# Patient Record
Sex: FEMALE | Race: WHITE | Employment: PART TIME | URBAN - METROPOLITAN AREA
[De-identification: names, ages, dates, MRNs, and addresses within clinical notes are randomized per-mention and may not be internally consistent; named-entity substitution may affect disease eponyms.]

---

## 2017-05-09 ENCOUNTER — OFFICE VISIT (OUTPATIENT)
Dept: INTERNAL MEDICINE CLINIC | Age: 57
End: 2017-05-09

## 2017-05-09 VITALS
DIASTOLIC BLOOD PRESSURE: 80 MMHG | WEIGHT: 232 LBS | SYSTOLIC BLOOD PRESSURE: 144 MMHG | HEART RATE: 62 BPM | TEMPERATURE: 97.9 F | OXYGEN SATURATION: 95 % | HEIGHT: 66 IN | BODY MASS INDEX: 37.28 KG/M2

## 2017-05-09 DIAGNOSIS — E66.9 OBESITY, UNSPECIFIED OBESITY SEVERITY, UNSPECIFIED OBESITY TYPE: ICD-10-CM

## 2017-05-09 DIAGNOSIS — R73.03 PREDIABETES: Primary | ICD-10-CM

## 2017-05-09 DIAGNOSIS — J01.90 ACUTE NON-RECURRENT SINUSITIS, UNSPECIFIED LOCATION: ICD-10-CM

## 2017-05-09 DIAGNOSIS — Z23 NEED FOR TDAP VACCINATION: ICD-10-CM

## 2017-05-09 RX ORDER — LEVOFLOXACIN 500 MG/1
500 TABLET, FILM COATED ORAL DAILY
Qty: 10 TAB | Refills: 0 | Status: SHIPPED | OUTPATIENT
Start: 2017-05-09 | End: 2017-09-19 | Stop reason: ALTCHOICE

## 2017-05-09 NOTE — LETTER
NOTIFICATION RETURN TO WORK / SCHOOL 
 
5/9/2017 5:06 PM 
 
Ms. Deedee Mg  [address] [id number] To Whom It May Concern: 
 
Anca Sandoval is currently under the care of [facility]. She will return to work/school on: 5-10-17 If there are questions or concerns please have the patient contact our office.  
 
 
 
Sincerely, 
 
 
Severa Eth, MD

## 2017-05-09 NOTE — MR AVS SNAPSHOT
Visit Information Date & Time Provider Department Dept. Phone Encounter #  
 5/9/2017  3:45 PM Elaine Barthel, [address] [phone] [id number] Follow-up Instructions Return in about 6 months (around 11/9/2017) for prediabetes BERTIN. Upcoming Health Maintenance Date Due DTaP/Tdap/Td series (1 - Tdap) 12/12/1983 BREAST CANCER SCRN MAMMOGRAM 6/1/2017 INFLUENZA AGE 9 TO ADULT 8/1/2017 PAP AKA CERVICAL CYTOLOGY 7/6/2018 COLONOSCOPY 1/16/2020 Allergies as of 5/9/2017  Review Complete On: 5/9/2017 By: Monique Tineo LPN Severity Noted Reaction Type Reactions Flagyl [Metronidazole]  06/14/2011    Hives Iodine  06/14/2011    Nausea and Vomiting Seafood [Shellfish Containing Products]  06/14/2011    Hives Current Immunizations  Reviewed on 2/27/2015 Name Date Hep A and Hep B Vaccine 4/23/2014, 11/11/2013, 10/14/2013 12:38 PM  
 Tdap  Incomplete Zoster Vaccine, Live 10/5/2013 Not reviewed this visit You Were Diagnosed With   
  
 Codes Comments Prediabetes    -  Primary ICD-10-CM: R73.03 
ICD-9-CM: 790.29 Acute non-recurrent sinusitis, unspecified location     ICD-10-CM: J01.90 ICD-9-CM: 461.9 Need for Tdap vaccination     ICD-10-CM: S64 ICD-9-CM: V06.1 Obesity, unspecified obesity severity, unspecified obesity type     ICD-10-CM: E66.9 ICD-9-CM: 278.00 Vitals BP Pulse Temp Height(growth percentile) Weight(growth percentile) SpO2  
 144/80 (BP 1 Location: Left arm, BP Patient Position: Sitting) 62 97.9 °F (36.6 °C) 5' 6\" (1.676 m) 232 lb (105.2 kg) 95% BMI Smoking Status 37.45 kg/m2 Former Smoker BMI and BSA Data Body Mass Index Body Surface Area  
 37.45 kg/m 2 2.21 m 2 Preferred Pharmacy Pharmacy Name Phone [facility] # [address], [address] [facility] [phone] Your Updated Medication List  
  
   
 This list is accurate as of: 5/9/17  5:01 PM.  Always use your most recent med list.  
  
  
  
  
 acetaminophen 500 mg tablet Commonly known as:  TYLENOL Take 1 tablet by mouth every eight (8) hours as needed for Pain. ALIGN 4 mg Cap Generic drug:  Bifidobacterium Infantis Take  by mouth.  
  
 levoFLOXacin 500 mg tablet Commonly known as:  John Colander Take 1 Tab by mouth daily. multivitamin tablet Commonly known as:  ONE A DAY Take 1 Tab by mouth daily. Prenat MV-Iron-Ca-LF-Bq7-NmwAM -1 mg Cmpk Take 1 Tab by mouth daily. Prescriptions Sent to Pharmacy Refills  
 levoFLOXacin (LEVAQUIN) 500 mg tablet 0 Sig: Take 1 Tab by mouth daily. Class: Normal  
 Pharmacy: [place] [address] [address], [address] [facility] #: [phone] Route: Oral  
  
We Performed the Following HEMOGLOBIN A1C WITH EAG [46315 CPT(R)] LIPID PANEL [45729 CPT(R)] METABOLIC PANEL, BASIC [26797 CPT(R)] TETANUS, DIPHTHERIA TOXOIDS AND ACELLULAR PERTUSSIS VACCINE (TDAP), IN INDIVIDS. >=7, IM [id number] CPT(R)] Follow-up Instructions Return in about 6 months (around 11/9/2017) for prediabetes BERTIN. Introducing [facility]! Sky Rodrigues introduces [employer] patient portal. Now you can access parts of your medical record, email your doctor's office, and request medication refills online. 1. In your internet browser, go to [email or web address] Iz okna 2. Click on the First Time User? Click Here link in the Sign In box. You will see the New Member Sign Up page. 3. Enter your [employer] Access Code exactly as it appears below. You will not need to use this code after youve completed the sign-up process. If you do not sign up before the expiration date, you must request a new code. · [employer] Access Code: [email or web address] Expires: 8/7/2017  5:01 PM 
 
4.  Enter the last four digits of your Social Security Number (xxxx) and Date of Birth (mm/dd/yyyy) as indicated and click Submit. You will be taken to the next sign-up page. 5. Create a Personal Life Media ID. This will be your Personal Life Media login ID and cannot be changed, so think of one that is secure and easy to remember. 6. Create a Personal [employer] password. You can change your password at any time. 7. Enter your Password Reset Question and Answer. This can be used at a later time if you forget your password. 8. Enter your e-mail address. You will receive e-mail notification when new information is available in [address] 9. Click Sign Up. You can now view and download portions of your medical record. 10. Click the Download Summary menu link to download a portable copy of your medical information. If you have questions, please visit the Frequently Asked Questions section of the Personal [employer] website. Remember, Personal [employer] is NOT to be used for urgent needs. For medical emergencies, dial 911. Now available from your iPhone and Android! Please provide this summary of care documentation to your next provider. Your primary care clinician is listed as Mati OTTO. If you have any questions after today's visit, please call [phone].

## 2017-05-09 NOTE — PROGRESS NOTES
HISTORY OF PRESENT ILLNESS  Mariola Sanches is a 49 y.o. female. HPI   6 month f/u   [facility] medrol davian  Still has green sinus drainage  Coughs up phlegm  Some chills. F/u prediabetes and HLD  Weight is unchamged, pt is going to start on an [employer]    Patient Active Problem List    Diagnosis Date Noted    Prediabetes 11/08/2016    Colon polyp 10/08/2013    FH: colon cancer 10/08/2013    Abnormal CT of the abdomen 07/27/2012    Mitral valve disorders 07/27/2012    Obesity 06/14/2011    BERTIN (obstructive sleep apnea) 06/14/2011    Allergic rhinitis 06/14/2011     Current Outpatient Prescriptions   Medication Sig Dispense Refill    Bifidobacterium Infantis (ALIGN) 4 mg cap Take  by mouth.  multivitamin (ONE A DAY) tablet Take 1 Tab by mouth daily.  acetaminophen (TYLENOL) 500 mg tablet Take 1 tablet by mouth every eight (8) hours as needed for Pain. 30 tablet 0    Prenat MV-Iron-Ca-KL-Ox2-DcaDI -1 mg Cmpk Take 1 Tab by mouth daily.  90 Tab 3     Allergies   Allergen Reactions    Flagyl [Metronidazole] Hives    Iodine Nausea and Vomiting    Seafood [Shellfish Containing Products] Hives      Lab Results  Component Value Date/Time   Hemoglobin A1c 6.1 11/08/2016 04:09 PM   Hemoglobin A1c 6.0 03/17/2016 04:28 PM   Hemoglobin A1c 5.9 08/26/2011 12:00 AM   Glucose 92 03/17/2016 04:28 PM   Glucose  02/27/2015 10:09 AM   LDL, calculated 114 03/17/2016 04:28 PM   Creatinine 0.78 03/17/2016 04:28 PM      Lab Results  Component Value Date/Time   Cholesterol, total 177 03/17/2016 04:28 PM   HDL Cholesterol 46 03/17/2016 04:28 PM   LDL, calculated 114 03/17/2016 04:28 PM   Triglyceride 85 03/17/2016 04:28 PM       Lab Results  Component Value Date/Time   GFR est  03/17/2016 04:28 PM   GFR est non-AA 87 03/17/2016 04:28 PM   Creatinine 0.78 03/17/2016 04:28 PM   BUN 10 03/17/2016 04:28 PM   Sodium 140 03/17/2016 04:28 PM   Potassium 4.5 03/17/2016 04:28 PM   Chloride 102 03/17/2016 04:28 PM   CO2 28 03/17/2016 04:28 PM         ROS    Physical Exam   Constitutional: She appears well-developed and well-nourished. Appears stated age   Cardiovascular: Normal rate, regular rhythm and normal heart sounds. Exam reveals no gallop and no friction rub. No murmur heard. Pulmonary/Chest: Effort normal and breath sounds normal. No respiratory distress. She has no wheezes. Abdominal: Soft. Bowel sounds are normal.   Musculoskeletal: She exhibits no edema. Neurological: She is alert. Psychiatric: She has a normal mood and affect. Nursing note and vitals reviewed. ASSESSMENT and PLAN  Elizabeth Anna was seen today for physical and sinus infection. Diagnoses and all orders for this visit:    Prediabetes  -     LIPID PANEL  -     HEMOGLOBIN A1C WITH EAG  -     METABOLIC PANEL, BASIC   To start chris diet   Advised to exercise regularly    Acute non-recurrent sinusitis, unspecified location   levaquin x 10d   Complete medrol davian  Need for Tdap vaccination  -     TETANUS, DIPHTHERIA TOXOIDS AND ACELLULAR PERTUSSIS VACCINE (TDAP), IN INDIVIDS. >=7, IM    Obesity, unspecified obesity severity, unspecified obesity type   I have reviewed/discussed the above normal BMI with the patient. I have recommended the following interventions: dietary management education, guidance, and counseling, encourage exercise and monitor weight . Dianna Wadsworth Other orders  -     levoFLOXacin (LEVAQUIN) 500 mg tablet; Take 1 Tab by mouth daily. Follow-up Disposition:  Return in about 6 months (around 11/9/2017) for prediabetes BERTIN.

## 2017-05-16 ENCOUNTER — APPOINTMENT (OUTPATIENT)
Dept: INTERNAL MEDICINE CLINIC | Age: 57
End: 2017-05-16

## 2017-05-17 LAB
BUN SERPL-MCNC: 11 MG/DL (ref 6–24)
BUN/CREAT SERPL: 13 (ref 9–23)
CALCIUM SERPL-MCNC: 9.2 MG/DL (ref 8.7–10.2)
CHLORIDE SERPL-SCNC: 99 MMOL/L (ref 96–106)
CHOLEST SERPL-MCNC: 177 MG/DL (ref 100–199)
CO2 SERPL-SCNC: 24 MMOL/L (ref 18–29)
CREAT SERPL-MCNC: 0.86 MG/DL (ref 0.57–1)
EST. AVERAGE GLUCOSE BLD GHB EST-MCNC: 123 MG/DL
GLUCOSE SERPL-MCNC: 106 MG/DL (ref 65–99)
HBA1C MFR BLD: 5.9 % (ref 4.8–5.6)
HDLC SERPL-MCNC: 44 MG/DL
LDLC SERPL CALC-MCNC: 109 MG/DL (ref 0–99)
POTASSIUM SERPL-SCNC: 4.3 MMOL/L (ref 3.5–5.2)
SODIUM SERPL-SCNC: 139 MMOL/L (ref 134–144)
TRIGL SERPL-MCNC: 121 MG/DL (ref 0–149)
VLDLC SERPL CALC-MCNC: 24 MG/DL (ref 5–40)

## 2017-09-19 ENCOUNTER — OFFICE VISIT (OUTPATIENT)
Dept: INTERNAL MEDICINE CLINIC | Age: 57
End: 2017-09-19

## 2017-09-19 VITALS
WEIGHT: 236 LBS | TEMPERATURE: 98 F | HEIGHT: 66 IN | OXYGEN SATURATION: 99 % | DIASTOLIC BLOOD PRESSURE: 72 MMHG | BODY MASS INDEX: 37.93 KG/M2 | SYSTOLIC BLOOD PRESSURE: 126 MMHG | HEART RATE: 55 BPM

## 2017-09-19 DIAGNOSIS — G62.9 NEUROPATHY: ICD-10-CM

## 2017-09-19 DIAGNOSIS — R73.03 PREDIABETES: Primary | ICD-10-CM

## 2017-09-19 NOTE — PROGRESS NOTES
HISTORY OF PRESENT ILLNESS  Brant Mancilla is a 49 y.o. female. HPI      F/u prediabetes, elevated blood pressure  Has had some tingling of bottom of right toe in the mornings  Taking a crm at compounding pharmacy for menopausal sxs  Will be seeing Dr. Inder Prado  Using cpap device for BERTIN  Weight has been stable per pt  Getting colonoscopy again in January   sees DERM Q 6 months for skin check-Dr. Kan Gunter    Patient Active Problem List    Diagnosis Date Noted    Prediabetes 11/08/2016    Colon polyp 10/08/2013    FH: colon cancer 10/08/2013    Abnormal CT of the abdomen 07/27/2012    Mitral valve disorders 07/27/2012    Obesity 06/14/2011    BERTIN (obstructive sleep apnea) 06/14/2011    Allergic rhinitis 06/14/2011     Current Outpatient Prescriptions   Medication Sig Dispense Refill    vit a,c & e-lutein-minerals (OCUVITE) tablet daily.  bioidentical hormones Take  by mouth.  Bifidobacterium Infantis (ALIGN) 4 mg cap Take  by mouth.  multivitamin (ONE A DAY) tablet Take 1 Tab by mouth daily.  acetaminophen (TYLENOL) 500 mg tablet Take 1 tablet by mouth every eight (8) hours as needed for Pain. 30 tablet 0    Prenat MV-Iron-Ca-HA-Ot0-IokHY -1 mg Cmpk Take 1 Tab by mouth daily.  90 Tab 3     Allergies   Allergen Reactions    Flagyl [Metronidazole] Hives    Iodine Nausea and Vomiting    Seafood [Shellfish Containing Products] Hives      Lab Results  Component Value Date/Time   Hemoglobin A1c 5.9 05/16/2017 09:33 AM   Hemoglobin A1c 6.1 11/08/2016 04:09 PM   Hemoglobin A1c 6.0 03/17/2016 04:28 PM   Glucose 106 05/16/2017 09:33 AM   Glucose  02/27/2015 10:09 AM   LDL, calculated 109 05/16/2017 09:33 AM   Creatinine 0.86 05/16/2017 09:33 AM      Lab Results  Component Value Date/Time   Cholesterol, total 177 05/16/2017 09:33 AM   HDL Cholesterol 44 05/16/2017 09:33 AM   LDL, calculated 109 05/16/2017 09:33 AM   Triglyceride 121 05/16/2017 09:33 AM     Lab Results  Component Value Date/Time   GFR est non-AA 77 05/16/2017 09:33 AM   GFR est AA 89 05/16/2017 09:33 AM   Creatinine 0.86 05/16/2017 09:33 AM   BUN 11 05/16/2017 09:33 AM   Sodium 139 05/16/2017 09:33 AM   Potassium 4.3 05/16/2017 09:33 AM   Chloride 99 05/16/2017 09:33 AM   CO2 24 05/16/2017 09:33 AM          ROS    Physical Exam   Constitutional: She appears well-developed and well-nourished. Appears stated age   Cardiovascular: Normal rate, regular rhythm and normal heart sounds. Exam reveals no gallop and no friction rub. No murmur heard. Pulmonary/Chest: Effort normal and breath sounds normal. No respiratory distress. She has no wheezes. Abdominal: Soft. Bowel sounds are normal.   Musculoskeletal: She exhibits no edema. Neurological: She is alert. Psychiatric: She has a normal mood and affect. Nursing note and vitals reviewed. ASSESSMENT and PLAN  Diagnoses and all orders for this visit:    1. Prediabetes  -     HEMOGLOBIN A1C WITH EAG   Low carb diet discussion and handout   Weight reduction needed-discussed    2. Neuropathy ([address]  -     HEMOGLOBIN A1C WITH EAG  -     VITAMIN G28  -     METABOLIC PANEL, BASIC   Referral to podiatrist      Follow-up Disposition:  Return in about 6 months (around 3/19/2018) for prediabetes bp weight f/u.

## 2017-09-19 NOTE — MR AVS SNAPSHOT
Visit Information Date & Time Provider Department Dept. Phone Encounter #  
 9/19/2017  2:30 PM Martha Bonilla, [address] [place] [address],[address] [phone] [id number] Follow-up Instructions Return in about 6 months (around 3/19/2018) for prediabetes bp weight f/u. Upcoming Health Maintenance Date Due  
 BREAST CANCER SCRN MAMMOGRAM 6/1/2017 PAP AKA CERVICAL CYTOLOGY 7/6/2018 COLONOSCOPY 1/16/2020 DTaP/Tdap/Td series (2 - Td) 5/9/2027 Allergies as of 9/19/2017  Review Complete On: 9/19/2017 By: Martha Bonilla MD  
  
 Severity Noted Reaction Type Reactions Flagyl [Metronidazole]  06/14/2011    Hives Iodine  06/14/2011    Nausea and Vomiting Seafood [Shellfish Containing Products]  06/14/2011    Hives Current Immunizations  Reviewed on 2/27/2015 Name Date Hep A and Hep B Vaccine 4/23/2014, 11/11/2013, 10/14/2013 12:38 PM  
 Tdap 5/9/2017 Zoster Vaccine, Live 10/5/2013 Not reviewed this visit You Were Diagnosed With   
  
 Codes Comments Prediabetes    -  Primary ICD-10-CM: R73.03 
ICD-9-CM: 790.29 Neuropathy ([address]     ICD-10-CM: G62.9 ICD-9-CM: 174. 9 Vitals BP Pulse Temp Height(growth percentile) Weight(growth percentile) 126/72 (!) 55 98 °F (36.7 °C) (Oral) 5' 6\" (1.676 m) 236 lb (107 kg) SpO2 BMI Smoking Status 99% 38.09 kg/m2 Former Smoker Vitals History BMI and BSA Data Body Mass Index Body Surface Area 38.09 kg/m 2 2.23 m 2 Preferred Pharmacy Pharmacy Name Phone [id number]  Selin [place], [address] [facility] [phone] Your Updated Medication List  
  
   
This list is accurate as of: 9/19/17  3:45 PM.  Always use your most recent med list.  
  
  
  
  
 acetaminophen 500 mg tablet Commonly known as:  TYLENOL Take 1 tablet by mouth every eight (8) hours as needed for Pain. ALIGN 4 mg Cap Generic drug:  Bifidobacterium Infantis Take  by mouth.  
  
 bioidentical hormones Take  by mouth.  
  
 multivitamin tablet Commonly known as:  ONE A DAY Take 1 Tab by mouth daily. Prenat MV-Iron-Ca-JK-Ej1-ZyfQK -1 mg Cmpk Take 1 Tab by mouth daily. vit a,c & e-lutein-minerals tablet Commonly known as:  OCUVITE  
daily. We Performed the Following HEMOGLOBIN A1C WITH EAG [21863 CPT(R)] METABOLIC PANEL, BASIC [79842 CPT(R)] REFERRAL TO PODIATRY [REF90 Custom] Comments:  
 Please evaluate patient for neuropathy of foot VITAMIN B12 [id number] CPT(R)] Follow-up Instructions Return in about 6 months (around 3/19/2018) for prediabetes bp weight f/u. Referral Information Referral ID Referred By Referred To  
  
 [id number] [address] [facility], [address] [place] [address] Phone: [phone] Fax: [phone] Visits Status Start Date End Date 1 New Request 9/19/17 9/19/18 If your referral has a status of pending review or denied, additional information will be sent to support the outcome of this decision. Introducing [facility]! Warren Fleming introduces [employer] patient portal. Now you can access parts of your medical record, email your doctor's office, and request medication refills online. 1. In your internet browser, go to [email or web address] 2. Click on the First Time User? Click Here link in the Sign In box. You will see the New Member Sign Up page. 3. Enter your [employer] Access Code exactly as it appears below. You will not need to use this code after youve completed the sign-up process. If you do not sign up before the expiration date, you must request a new code. · [employer] Access Code: [email or web address] Expires: 12/18/2017  3:41 PM 
 
4. Enter the last four digits of your Social Security Number (xxxx) and Date of Birth (mm/dd/yyyy) as indicated and click Submit.  You will be taken to the next sign-up page. 5. Create a "[employer] ID. This will be your "[employer] login ID and cannot be changed, so think of one that is secure and easy to remember. 6. Create a "[employer] password. You can change your password at any time. 7. Enter your Password Reset Question and Answer. This can be used at a later time if you forget your password. 8. Enter your e-mail address. You will receive e-mail notification when new information is available in [address] 9. Click Sign Up. You can now view and download portions of your medical record. 10. Click the Download Summary menu link to download a portable copy of your medical information. If you have questions, please visit the Frequently Asked Questions section of the "[employer] website. Remember, "[employer] is NOT to be used for urgent needs. For medical emergencies, dial 911. Now available from your iPhone and Android! Please provide this summary of care documentation to your next provider. Your primary care clinician is listed as Naga OTTO. If you have any questions after today's visit, please call [phone].

## 2017-09-20 LAB
BUN SERPL-MCNC: 17 MG/DL (ref 6–24)
BUN/CREAT SERPL: 22 (ref 9–23)
CALCIUM SERPL-MCNC: 9.2 MG/DL (ref 8.7–10.2)
CHLORIDE SERPL-SCNC: 102 MMOL/L (ref 96–106)
CO2 SERPL-SCNC: 27 MMOL/L (ref 18–29)
CREAT SERPL-MCNC: 0.78 MG/DL (ref 0.57–1)
EST. AVERAGE GLUCOSE BLD GHB EST-MCNC: 111 MG/DL
GLUCOSE SERPL-MCNC: 78 MG/DL (ref 65–99)
HBA1C MFR BLD: 5.5 % (ref 4.8–5.6)
POTASSIUM SERPL-SCNC: 4.3 MMOL/L (ref 3.5–5.2)
SODIUM SERPL-SCNC: 142 MMOL/L (ref 134–144)
VIT B12 SERPL-MCNC: 236 PG/ML (ref 211–946)

## 2018-03-20 ENCOUNTER — OFFICE VISIT (OUTPATIENT)
Dept: INTERNAL MEDICINE CLINIC | Age: 58
End: 2018-03-20

## 2018-03-20 VITALS
BODY MASS INDEX: 37.77 KG/M2 | HEIGHT: 66 IN | DIASTOLIC BLOOD PRESSURE: 69 MMHG | OXYGEN SATURATION: 98 % | WEIGHT: 235 LBS | HEART RATE: 63 BPM | TEMPERATURE: 97.9 F | SYSTOLIC BLOOD PRESSURE: 155 MMHG

## 2018-03-20 DIAGNOSIS — R73.03 PREDIABETES: ICD-10-CM

## 2018-03-20 DIAGNOSIS — R03.0 ELEVATED BLOOD PRESSURE READING: Primary | ICD-10-CM

## 2018-03-20 NOTE — PROGRESS NOTES
HISTORY OF PRESENT ILLNESS  Jose García is a 56 y.o. female. HPI        F/u prediabetes, elevated blood pressure    Last visit:  Has had some tingling of bottom of right toe in the mornings  Taking a crm at compounding pharmacy for menopausal sxs  Will be seeing Dr. Yamilet Harris  Using cpap device for BERTIN  Weight has been stable per pt  Getting colonoscopy again in January   sees DERM Q 6 months for skin check-Dr. Sherrie Martinez      [facility]  Pt made an appt at [address] [place] [address]  Urology to f/u UTI and frequent UTI        Patient Active Problem List    Diagnosis Date Noted    Prediabetes 11/08/2016    Colon polyp 10/08/2013    FH: colon cancer 10/08/2013    Abnormal CT of the abdomen 07/27/2012    Mitral valve disorders(424.0) 07/27/2012    Obesity 06/14/2011    BERTIN (obstructive sleep apnea) 06/14/2011    Allergic rhinitis 06/14/2011     Current Outpatient Prescriptions   Medication Sig Dispense Refill    vit a,c & e-lutein-minerals (OCUVITE) tablet daily.  bioidentical hormones Take  by mouth.  Bifidobacterium Infantis (ALIGN) 4 mg cap Take  by mouth.  multivitamin (ONE A DAY) tablet Take 1 Tab by mouth daily.  acetaminophen (TYLENOL) 500 mg tablet Take 1 tablet by mouth every eight (8) hours as needed for Pain. 30 tablet 0    Prenat MV-Iron-Ca-PM-Ze1-ZzgLE -1 mg Cmpk Take 1 Tab by mouth daily.  90 Tab 3     Allergies   Allergen Reactions    Flagyl [Metronidazole] Hives    Iodine Nausea and Vomiting    Seafood [Shellfish Containing Products] Hives      Lab Results  Component Value Date/Time   Hemoglobin A1c 5.5 09/19/2017 03:53 PM   Hemoglobin A1c 5.9 (H) 05/16/2017 09:33 AM   Hemoglobin A1c 6.1 (H) 11/08/2016 04:09 PM   Glucose 78 09/19/2017 03:53 PM   Glucose  02/27/2015 10:09 AM   LDL, calculated 109 (H) 05/16/2017 09:33 AM   Creatinine 0.78 09/19/2017 03:53 PM      Lab Results  Component Value Date/Time   Cholesterol, total 177 05/16/2017 09:33 AM   HDL Cholesterol 44 05/16/2017 09:33 AM   LDL, calculated 109 (H) 05/16/2017 09:33 AM   Triglyceride 121 05/16/2017 09:33 AM     Lab Results  Component Value Date/Time   GFR est non-AA 86 09/19/2017 03:53 PM   GFR est  09/19/2017 03:53 PM   Creatinine 0.78 09/19/2017 03:53 PM   BUN 17 09/19/2017 03:53 PM   Sodium 142 09/19/2017 03:53 PM   Potassium 4.3 09/19/2017 03:53 PM   Chloride 102 09/19/2017 03:53 PM   CO2 27 09/19/2017 03:53 PM        ROS    Physical Exam   Constitutional: She appears well-developed and well-nourished. No distress. Appears stated age   Cardiovascular: Normal rate, regular rhythm and normal heart sounds. Exam reveals no gallop and no friction rub. No murmur heard. Pulmonary/Chest: Effort normal and breath sounds normal. No respiratory distress. She has no wheezes. Abdominal: Soft. Bowel sounds are normal.   Musculoskeletal: She exhibits no edema. Neurological: She is alert. Psychiatric: She has a normal mood and affect. Nursing note and vitals reviewed. ASSESSMENT and PLAN  Diagnoses and all orders for this visit:    1. Elevated blood pressure reading   Pt declines bp lowering medication   DASH diet handout   bp monitoring  2. Prediabetes   Last a1c , glucose wn   Advised weight reduction , diet and exercise    Follow-up Disposition:  Return in about 6 months (around 9/20/2018) for cpe-bp /prediabetes.

## 2018-03-20 NOTE — MR AVS SNAPSHOT
[address] [facility] 
[phone] Patient: Mitchell Cano MRN: [id number] :1960 Visit Information Date & Time Provider Department Dept. Phone Encounter #  
 3/20/2018  3:00 PM Milad Vegas, [address] [place] [address],[address] [phone] [id number] Follow-up Instructions Return in about 6 months (around 2018) for cpe-bp /prediabetes. Upcoming Health Maintenance Date Due  
 BREAST CANCER SCRN MAMMOGRAM 2017 PAP AKA CERVICAL CYTOLOGY 2018 COLONOSCOPY 2020 DTaP/Tdap/Td series (2 - Td) 2027 Allergies as of 3/20/2018  Review Complete On: 2017 By: Milad Vegas MD  
  
 Severity Noted Reaction Type Reactions Flagyl [Metronidazole]  2011    Hives Iodine  2011    Nausea and Vomiting Seafood [Shellfish Containing Products]  2011    Hives Current Immunizations  Reviewed on 2015 Name Date Hep A and Hep B Vaccine 2014, 2013, 10/14/2013 12:38 PM  
 Tdap 2017 Zoster Vaccine, Live 10/5/2013 Not reviewed this visit You Were Diagnosed With   
  
 Codes Comments Elevated blood pressure reading    -  Primary ICD-10-CM: R03.0 ICD-9-CM: 796.2 Prediabetes     ICD-10-CM: R73.03 
ICD-9-CM: 790.29 Vitals BP Pulse Temp Height(growth percentile) Weight(growth percentile) SpO2  
 155/69 (BP 1 Location: Left arm, BP Patient Position: Sitting) 63 97.9 °F (36.6 °C) (Oral) 5' 6\" (1.676 m) 235 lb (106.6 kg) 98% BMI OB Status Smoking Status 37.93 kg/m2 Menopause Former Smoker BMI and BSA Data Body Mass Index Body Surface Area  
 37.93 kg/m 2 2.23 m 2 Preferred Pharmacy Pharmacy Name Phone [facility][id number] - [address] [phone] Your Updated Medication List  
  
   
 This list is accurate as of 3/20/18  4:01 PM.  Always use your most recent med list.  
  
  
  
  
 acetaminophen 500 mg tablet Commonly known as:  TYLENOL Take 1 tablet by mouth every eight (8) hours as needed for Pain. ALIGN 4 mg Cap Generic drug:  Bifidobacterium Infantis Take  by mouth.  
  
 bioidentical hormones Take  by mouth.  
  
 multivitamin tablet Commonly known as:  ONE A DAY Take 1 Tab by mouth daily. Prenat MV-Iron-Ca-OK-Qa6-PvdBX -1 mg Cmpk Take 1 Tab by mouth daily. vit a,c & e-lutein-minerals tablet Commonly known as:  OCUVITE  
daily. Follow-up Instructions Return in about 6 months (around 9/20/2018) for cpe-bp /prediabetes. Introducing \[facility]\"" & HEALTH SERVICES! [facility] introduces [employer] patient portal. Now you can access parts of your medical record, email your doctor's office, and request medication refills online. 1. In your internet browser, go to [email or web address] 2. Click on the First Time User? Click Here link in the Sign In box. You will see the New Member Sign Up page. 3. Enter your [employer] Access Code exactly as it appears below. You will not need to use this code after youve completed the sign-up process. If you do not sign up before the expiration date, you must request a new code. · [employer] Access Code: [email or web address] Expires: 6/18/2018  4:01 PM 
 
4. Enter the last four digits of your Social Security Number (xxxx) and Date of Birth (mm/dd/yyyy) as indicated and click Submit. You will be taken to the next sign-up page. 5. Create a [employer] ID. This will be your [employer] login ID and cannot be changed, so think of one that is secure and easy to remember. 6. Create a [employer] password. You can change your password at any time. 7. Enter your Password Reset Question and Answer. This can be used at a later time if you forget your password. 8. Enter your e-mail address. You will receive e-mail notification when new information is available in [address] 9. Click Sign Up. You can now view and download portions of your medical record. 10. Click the Download Summary menu link to download a portable copy of your medical information. If you have questions, please visit the Frequently Asked Questions section of the Epuls website. Remember, Ep[employer] is NOT to be used for urgent needs. For medical emergencies, dial 911. Now available from your iPhone and Android! Please provide this summary of care documentation to your next provider. Your primary care clinician is listed as Governor Amada OTTO. If you have any questions after today's visit, please call [phone].

## 2018-09-18 ENCOUNTER — OFFICE VISIT (OUTPATIENT)
Dept: INTERNAL MEDICINE CLINIC | Age: 58
End: 2018-09-18

## 2018-09-18 VITALS
HEIGHT: 66 IN | BODY MASS INDEX: 38.57 KG/M2 | DIASTOLIC BLOOD PRESSURE: 82 MMHG | SYSTOLIC BLOOD PRESSURE: 132 MMHG | WEIGHT: 240 LBS | TEMPERATURE: 97 F | HEART RATE: 52 BPM | OXYGEN SATURATION: 99 %

## 2018-09-18 DIAGNOSIS — Z00.00 ROUTINE GENERAL MEDICAL EXAMINATION AT A HEALTH CARE FACILITY: Primary | ICD-10-CM

## 2018-09-18 DIAGNOSIS — E66.9 OBESITY (BMI 30-39.9): ICD-10-CM

## 2018-09-18 DIAGNOSIS — R03.0 ELEVATED SYSTOLIC BLOOD PRESSURE READING WITHOUT DIAGNOSIS OF HYPERTENSION: ICD-10-CM

## 2018-09-18 DIAGNOSIS — R73.03 PREDIABETES: ICD-10-CM

## 2018-09-18 DIAGNOSIS — R31.29 MICROSCOPIC HEMATURIA: ICD-10-CM

## 2018-09-18 PROBLEM — E66.01 SEVERE OBESITY (BMI 35.0-39.9): Status: ACTIVE | Noted: 2018-09-18

## 2018-09-18 RX ORDER — ESTRADIOL 10 UG/1
10 INSERT VAGINAL
Qty: 10 TAB | Refills: 11 | Status: SHIPPED | OUTPATIENT
Start: 2018-09-20 | End: 2019-03-28

## 2018-09-18 NOTE — PROGRESS NOTES
Chief Complaint Patient presents with  Complete Physical  
  6 month follow up  Blood sugar problem 6 month follow up  Labs 6 month follow up

## 2018-09-18 NOTE — PROGRESS NOTES
HISTORY OF PRESENT ILLNESS Bill Red is a 56 y.o. female. HPI  
  
F/u prediabetes, elevated blood pressure, BERTIN, obesity S/p removal ganglion cyst left index finger 2 months ago--Dr Sherrie Givens 
 
  
Last visit: 
Has had some tingling of bottom of right toe in the mornings Taking a crm at compounding pharmacy for menopausal sxs Will be seeing Dr. Sukhdev Schroeder Using cpap device for BERTIN Weight has been stable per pt Getting colonoscopy again in January 
 sees DERM Q 6 months for skin check-Dr. Grisel Jacobson 
  
  
[facility] Pt made an appt at [facility] to f/u UTI and frequent UTI 
  
 
Patient Active Problem List  
 Diagnosis Date Noted  Severe obesity (BMI 35.0-39.9) ([facility] [address] 09/18/2018  Prediabetes 11/08/2016  Colon polyp 10/08/2013  
 FH: colon cancer 10/08/2013  Abnormal CT of the abdomen 07/27/2012  Mitral valve disorders(424.0) 07/27/2012  Obesity 06/14/2011  BERTIN (obstructive sleep apnea) 06/14/2011  Allergic rhinitis 06/14/2011 Current Outpatient Prescriptions Medication Sig Dispense Refill  bioidentical hormones Take  by mouth.  Prenat MV-Iron-Ca-KA-Rh8-OanBT -1 mg Cmpk Take 1 Tab by mouth daily. 90 Tab 3  
 vit a,c & e-lutein-minerals (OCUVITE) tablet daily.  Bifidobacterium Infantis (ALIGN) 4 mg cap Take  by mouth.  multivitamin (ONE A DAY) tablet Take 1 Tab by mouth daily.  acetaminophen (TYLENOL) 500 mg tablet Take 1 tablet by mouth every eight (8) hours as needed for Pain. 30 tablet 0 Allergies Allergen Reactions  Flagyl [Metronidazole] Hives  Iodine Nausea and Vomiting  Seafood [Shellfish Containing Products] Hives Lab Results Component Value Date/Time Hemoglobin A1c 5.5 09/19/2017 03:53 PM  
Hemoglobin A1c 5.9 (H) 05/16/2017 09:33 AM  
Hemoglobin A1c 6.1 (H) 11/08/2016 04:09 PM  
Glucose 78 09/19/2017 03:53 PM  
Glucose  02/27/2015 10:09 AM  
 LDL, calculated 109 (H) 05/16/2017 09:33 AM  
Creatinine 0.78 09/19/2017 03:53 PM  
  
Lab Results Component Value Date/Time Cholesterol, total 177 05/16/2017 09:33 AM  
HDL Cholesterol 44 05/16/2017 09:33 AM  
LDL, calculated 109 (H) 05/16/2017 09:33 AM  
Triglyceride 121 05/16/2017 09:33 AM  
 
Lab Results Component Value Date/Time GFR est non-AA 86 09/19/2017 03:53 PM  
GFR est  09/19/2017 03:53 PM  
Creatinine 0.78 09/19/2017 03:53 PM  
BUN 17 09/19/2017 03:53 PM  
Sodium 142 09/19/2017 03:53 PM  
Potassium 4.3 09/19/2017 03:53 PM  
Chloride 102 09/19/2017 03:53 PM  
CO2 27 09/19/2017 03:53 PM  
  
 
ROS Physical Exam  
Constitutional: She appears well-developed and well-nourished. Appears stated age, obesity HENT:  
Mouth/Throat: Oropharynx is clear and moist.  
Eyes: Pupils are equal, round, and reactive to light. Neck: No JVD present. No tracheal deviation present. No thyromegaly present. Cardiovascular: Normal rate, regular rhythm, normal heart sounds and intact distal pulses. Exam reveals no gallop and no friction rub. No murmur heard. Pulmonary/Chest: Effort normal and breath sounds normal. No respiratory distress. She has no wheezes. Abdominal: Soft. Bowel sounds are normal. She exhibits no distension and no mass. There is no tenderness. There is no rebound and no guarding. Musculoskeletal: She exhibits no edema. Lymphadenopathy:  
  She has no cervical adenopathy. Neurological: She is alert. Skin: Skin is warm. Psychiatric: She has a normal mood and affect. Nursing note and vitals reviewed. ASSESSMENT and PLAN Diagnoses and all orders for this visit: 1. Routine general medical examination at a health care facility 
-     CBC W/O DIFF 
-     METABOLIC PANEL, COMPREHENSIVE 
-     LIPID PANEL 
-     HEMOGLOBIN A1C WITH EAG 
-     TSH 3RD GENERATION 
 UTD well woman care and colonoscopy Declines flu shot 2. Obesity (BMI 30-39. 9) I have reviewed/discussed the above normal BMI with the patient. I have recommended the following interventions: dietary management education, guidance, and counseling and encourage exercise . Jaspal Gatica 3. Prediabetes 4. Elevated systolic blood pressure reading without diagnosis of hypertension Normotensive to my check 5. Microscopic hematuria Recent neg w/u I refilled vaginal estrogen tab Other orders -     estradiol (YUVAFEM) 10 mcg tab vaginal tablet; Insert 1 Tab into vagina every Monday and Thursday. Follow-up Disposition: 
Return in about 6 months (around 3/18/2019) for prediabetes bp elevated.

## 2018-09-18 NOTE — MR AVS SNAPSHOT
[address] [facility] 
[phone] Patient: Maurilio Quach MRN: [id number] :1960 Visit Information Date & Time Provider Department Dept. Phone Encounter #  
 2018  3:00 PM Ziggy Aldridge [facility] [phone] [id number] Follow-up Instructions Return in about 6 months (around 3/18/2019) for prediabetes bp elevated. Upcoming Health Maintenance Date Due  
 BREAST CANCER SCRN MAMMOGRAM 2017 PAP AKA CERVICAL CYTOLOGY 2018 Influenza Age 5 to Adult 3/31/2019* COLONOSCOPY 2020 DTaP/Tdap/Td series (2 - Td) 2027 *Topic was postponed. The date shown is not the original due date. Allergies as of 2018  Review Complete On: 2018 By: Nerissa Wang LPN Severity Noted Reaction Type Reactions Flagyl [Metronidazole]  2011    Hives Iodine  2011    Nausea and Vomiting Seafood [Shellfish Containing Products]  2011    Hives Current Immunizations  Reviewed on 2015 Name Date Hep A and Hep B Vaccine 2014, 2013, 10/14/2013 12:38 PM  
 Tdap 2017 Zoster Vaccine, Live 10/5/2013 Not reviewed this visit You Were Diagnosed With   
  
 Codes Comments Routine general medical examination at a health care facility    -  Primary ICD-10-CM: Z00.00 ICD-9-CM: V70.0 Obesity (BMI 30-39. 9)     ICD-10-CM: E66.9 ICD-9-CM: 278.00 Prediabetes     ICD-10-CM: R73.03 
ICD-9-CM: 790.29 Elevated systolic blood pressure reading without diagnosis of hypertension     ICD-10-CM: R03.0 ICD-9-CM: 796.2 Microscopic hematuria     ICD-10-CM: R31.29 ICD-9-CM: 599.72 Vitals BP Pulse Temp Height(growth percentile) Weight(growth percentile) SpO2  
 132/82 (!) 52 97 °F (36.1 °C) (Oral) 5' 6\" (1.676 m) 240 lb (108.9 kg) 99% BMI OB Status Smoking Status 38.74 kg/m2 Menopause Former Smoker Vitals History BMI and BSA Data Body Mass Index Body Surface Area 38.74 kg/m 2 2.25 m 2 Preferred Pharmacy Pharmacy Name Phone [facility][id number] - [address] [address] [phone] Your Updated Medication List  
  
   
This list is accurate as of 9/18/18  3:50 PM.  Always use your most recent med list.  
  
  
  
  
 acetaminophen 500 mg tablet Commonly known as:  TYLENOL Take 1 tablet by mouth every eight (8) hours as needed for Pain. ALIGN 4 mg Cap Generic drug:  Bifidobacterium Infantis Take  by mouth.  
  
 bioidentical hormones Take  by mouth.  
  
 estradiol 10 mcg Tab vaginal tablet Commonly known as:  Florecitaeriberto Youngblood Insert 1 Tab into vagina every Monday and Thursday. Start taking on:  9/20/2018  
  
 multivitamin tablet Commonly known as:  ONE A DAY Take 1 Tab by mouth daily. Prenat MV-Iron-Ca-ES-Ht3-SfcKL -1 mg Cmpk Take 1 Tab by mouth daily. vit a,c & e-lutein-minerals tablet Commonly known as:  OCUVITE  
daily. Prescriptions Sent to Pharmacy Refills  
 estradiol (YUVAFEM) 10 mcg tab vaginal tablet 11 Starting on: 9/20/2018 Sig: Insert 1 Tab into vagina every Monday and Thursday. Class: Normal  
 Pharmacy: [facility] [address] [place] [address] Ph #: [phone] Route: Vaginal  
  
We Performed the Following CBC W/O DIFF [72273 CPT(R)] HEMOGLOBIN A1C WITH EAG [65875 CPT(R)] LIPID PANEL [74434 CPT(R)] METABOLIC PANEL, COMPREHENSIVE [46448 CPT(R)] TSH 3RD GENERATION [52003 CPT(R)] Follow-up Instructions Return in about 6 months (around 3/18/2019) for prediabetes bp elevated. Introducing [facility] & HEALTH SERVICES! Miss[facility] introduces [employer] patient portal. Now you can access parts of your medical record, email your doctor's office, and request medication refills online. 1. In your internet browser, go to [email or web address] 2. Click on the First Time User? Click Here link in the Sign In box. You will see the New Member Sign Up page. 3. Enter your [employer] Access Code exactly as it appears below. You will not need to use this code after youve completed the sign-up process. If you do not sign up before the expiration date, you must request a new code. · [employer] Access Code: [email or web address] Expires: 12/11/2018  1:49 PM 
 
4. Enter the last four digits of your Social Security Number (xxxx) and Date of Birth (mm/dd/yyyy) as indicated and click Submit. You will be taken to the next sign-up page. 5. Create a DeNovaMedt ID. This will be your [employer] login ID and cannot be changed, so think of one that is secure and easy to remember. 6. Create a [employer] password. You can change your password at any time. 7. Enter your Password Reset Question and Answer. This can be used at a later time if you forget your password. 8. Enter your e-mail address. You will receive e-mail notification when new information is available in [address] 9. Click Sign Up. You can now view and download portions of your medical record. 10. Click the Download Summary menu link to download a portable copy of your medical information. If you have questions, please visit the Frequently Asked Questions section of the [employer] website. Remember, [employer] is NOT to be used for urgent needs. For medical emergencies, dial 911. Now available from your iPhone and Android! Please provide this summary of care documentation to your next provider. Your primary care clinician is listed as Chase OTTO. If you have any questions after today's visit, please call [phone].

## 2018-11-03 ENCOUNTER — OFFICE VISIT (OUTPATIENT)
Dept: URGENT CARE | Age: 58
End: 2018-11-03

## 2018-11-03 VITALS
SYSTOLIC BLOOD PRESSURE: 169 MMHG | HEART RATE: 68 BPM | WEIGHT: 241 LBS | TEMPERATURE: 98 F | OXYGEN SATURATION: 99 % | DIASTOLIC BLOOD PRESSURE: 77 MMHG | HEIGHT: 66 IN | RESPIRATION RATE: 16 BRPM | BODY MASS INDEX: 38.73 KG/M2

## 2018-11-03 DIAGNOSIS — R35.0 URINARY FREQUENCY: Primary | ICD-10-CM

## 2018-11-03 DIAGNOSIS — N39.0 URINARY TRACT INFECTION WITH HEMATURIA, SITE UNSPECIFIED: ICD-10-CM

## 2018-11-03 DIAGNOSIS — R31.9 URINARY TRACT INFECTION WITH HEMATURIA, SITE UNSPECIFIED: ICD-10-CM

## 2018-11-03 LAB
BILIRUB UR QL STRIP: NEGATIVE
GLUCOSE UR-MCNC: NEGATIVE MG/DL
KETONES P FAST UR STRIP-MCNC: NORMAL MG/DL
PH UR STRIP: 5.5 [PH] (ref 4.6–8)
PROT UR QL STRIP: NORMAL
SP GR UR STRIP: 1.03 (ref 1–1.03)
UA UROBILINOGEN AMB POC: NORMAL (ref 0.2–1)
URINALYSIS CLARITY POC: NORMAL
URINALYSIS COLOR POC: NORMAL
URINE BLOOD POC: NORMAL
URINE LEUKOCYTES POC: NORMAL
URINE NITRITES POC: NEGATIVE

## 2018-11-03 RX ORDER — NITROFURANTOIN 25; 75 MG/1; MG/1
100 CAPSULE ORAL 2 TIMES DAILY
Qty: 10 CAP | Refills: 0 | Status: SHIPPED | OUTPATIENT
Start: 2018-11-03 | End: 2018-11-03

## 2018-11-03 RX ORDER — PHENAZOPYRIDINE HYDROCHLORIDE 95 MG/1
190 TABLET ORAL
Qty: 12 TAB | Refills: 0 | Status: SHIPPED | OUTPATIENT
Start: 2018-11-03 | End: 2018-11-05

## 2018-11-03 RX ORDER — NITROFURANTOIN 25; 75 MG/1; MG/1
100 CAPSULE ORAL 2 TIMES DAILY
Qty: 10 CAP | Refills: 0 | Status: SHIPPED | OUTPATIENT
Start: 2018-11-03 | End: 2018-11-08

## 2018-11-03 NOTE — PROGRESS NOTES
Erick Morley is a 56 y.o. Female presenting to clinic today with urinary discomfort x \"a few days. \" She states that it worsened yesterday so she came for evaluation. She reports that it is typical of bladder infections that she has had in the past. Endorses pressure after voiding, urinary frequency, and urgency. States that she has had so much discomfort that she used a vaginal suppository pill (unknown name) to alleviate her symptoms. She stated that it relieved her symptoms temporarily. Denies fevers, chills, or flank pain. Reports that she is followed by [facility] for hematuria. Denies other complaint today. The history is provided by the patient. History limited by: nothing. Past Medical History:   Diagnosis Date    Bronchitis         Past Surgical History:   Procedure Laterality Date    HX BUNIONECTOMY Bilateral     x2 both feet    HX ORTHOPAEDIC      right hand ligament repair         Family History   Problem Relation Age of Onset    Elevated Lipids Mother     Cancer Father         pancreatic s/p whipple    Hypertension Father         Social History     Socioeconomic History    Marital status: SINGLE     Spouse name: Not on file    Number of children: Not on file    Years of education: Not on file    Highest education level: Not on file   Social Needs    Financial resource strain: Not on file    Food insecurity - worry: Not on file    Food insecurity - inability: Not on file   [employer] needs - medical: Not on file   [employer] needs - non-medical: Not on file   Occupational History    Not on file   Tobacco Use    Smoking status: Former Smoker     Packs/day: 0.25     Years: 10.00     Pack years: 2.50    Smokeless tobacco: Never Used   Substance and Sexual Activity    Alcohol use:  Yes     Alcohol/week: 0.5 oz     Types: 1 Glasses of wine per week    Drug use: Yes     Types: OTC, Prescription    Sexual activity: Yes     Partners: Male     Comment: rarely   Other Topics Concern    Not on file   Social History Narrative    Not on file                ALLERGIES: Flagyl [metronidazole]; Iodine; and Seafood [shellfish containing products]    Review of Systems   Constitutional: Negative for chills and fever. HENT: Negative for congestion, rhinorrhea, sneezing and sore throat. Eyes: Negative for pain. Respiratory: Negative for chest tightness, shortness of breath and wheezing. Cardiovascular: Negative for chest pain. Gastrointestinal: Negative for abdominal pain, nausea and vomiting. Genitourinary: Positive for dysuria, frequency and urgency. Negative for flank pain. Musculoskeletal: Negative for back pain. Neurological: Negative for dizziness. Vitals:    11/03/18 1740   BP: 169/77   Pulse: 68   Resp: 16   Temp: 98 °F (36.7 °C)   SpO2: 99%   Weight: 241 lb (109.3 kg)   Height: 5' 6\" (1.676 m)       Physical Exam   Constitutional: She is oriented to person, place, and time. She appears well-developed and well-nourished. No distress. HENT:   Head: Normocephalic and atraumatic. Eyes: EOM are normal.   Neck: Normal range of motion. Cardiovascular: Normal rate, regular rhythm and normal heart sounds. Pulmonary/Chest: Effort normal and breath sounds normal. No respiratory distress. Abdominal: Soft. Bowel sounds are normal. She exhibits no distension. There is no tenderness. Musculoskeletal: Normal range of motion. No CVA tenderness. Neurological: She is alert and oriented to person, place, and time. Skin: Skin is warm and dry. She is not diaphoretic. Psychiatric: She has a normal mood and affect. Her behavior is normal. Judgment and thought content normal.   Nursing note and vitals reviewed.       MDM  Results for orders placed or performed in visit on 11/03/18   AMB POC URINALYSIS DIP STICK AUTO W/O MICRO   Result Value Ref Range    Color (UA POC)      Clarity (UA POC)      Glucose (UA POC) Negative Negative    Bilirubin (UA POC) Negative Negative    Ketones (UA POC) 1+ Negative    Specific gravity (UA POC) 1.030 1.001 - 1.035    Blood (UA POC) 3+ Negative    pH (UA POC) 5.5 4.6 - 8.0    Protein (UA POC) Trace Negative    Urobilinogen (UA POC) 0.2 mg/dL 0.2 - 1    Nitrites (UA POC) Negative Negative    Leukocyte esterase (UA POC) Trace Negative       PLAN:  Patient presents to clinic with dysuria, frequency, and urgency x \"a few days. \" Reports frequent UTIs and states that this feels the same. Followed by urology for hematuria. Afebrile, nontoxic appearing, no CVA tenderness. 1. Urine shows trace leukocytes, 3+ blood, and 1+ ketones. Not a definitive infection, but will treat based on symptoms. Culture sent. 2. Rx macrobid (had to re- prescribe this so it could be sent to pharmacy) and PRN pyridium  x2 days. 3. Advised patient to increase fluid intake, decrease caffeine intake, make sure to void regularly and not hold urine in the bladder for long periods of time. 4. Follow up with urology  5. Follow up with PCP this week  6. Go to ED with worsening symptoms, failure to improve, or any new symptoms. DIAGNOSES:    ICD-10-CM ICD-9-CM    1. Urinary frequency R35.0 788.41 AMB POC URINALYSIS DIP STICK AUTO W/O MICRO      CULTURE, URINE   2. Urinary tract infection with hematuria, site unspecified N39.0 599.0     R31.9 599.70      Medications Ordered Today   Medications    DISCONTD: nitrofurantoin, macrocrystal-monohydrate, (MACROBID) 100 mg capsule     Sig: Take 1 Cap by mouth two (2) times a day for 5 days. Dispense:  10 Cap     Refill:  0    nitrofurantoin, macrocrystal-monohydrate, (MACROBID) 100 mg capsule     Sig: Take 1 Cap by mouth two (2) times a day for 5 days. Dispense:  10 Cap     Refill:  0    phenazopyridine (PYRIDIUM) 95 mg tab     Sig: Take 2 Tabs by mouth three (3) times daily as needed for Pain for up to 2 days.      Dispense:  12 Tab     Refill:  0     Procedures

## 2018-11-03 NOTE — PATIENT INSTRUCTIONS
Follow up with your urologist within 1-2 weeks. Follow up with your primary care provider this week. Go to the Emergency Department with worsening symptoms, failure to improve, or any new symptoms. Urinary Tract Infection in Women: Care Instructions  Your Care Instructions    A urinary tract infection, or UTI, is a general term for an infection anywhere between the kidneys and the urethra (where urine comes out). Most UTIs are bladder infections. They often cause pain or burning when you urinate. UTIs are caused by bacteria and can be cured with antibiotics. Be sure to complete your treatment so that the infection goes away. Follow-up care is a key part of your treatment and safety. Be sure to make and go to all appointments, and call your doctor if you are having problems. It's also a good idea to know your test results and keep a list of the medicines you take. How can you care for yourself at home? · Take your antibiotics as directed. Do not stop taking them just because you feel better. You need to take the full course of antibiotics. · Drink extra water and other fluids for the next day or two. This may help wash out the bacteria that are causing the infection. (If you have kidney, heart, or liver disease and have to limit fluids, talk with your doctor before you increase your fluid intake.)  · Avoid drinks that are carbonated or have caffeine. They can irritate the bladder. · Urinate often. Try to empty your bladder each time. · To relieve pain, take a hot bath or lay a heating pad set on low over your lower belly or genital area. Never go to sleep with a heating pad in place. To prevent UTIs  · Drink plenty of water each day. This helps you urinate often, which clears bacteria from your system. (If you have kidney, heart, or liver disease and have to limit fluids, talk with your doctor before you increase your fluid intake.)  · Urinate when you need to.   · Urinate right after you have sex.  · Change sanitary pads often. · Avoid douches, bubble baths, feminine hygiene sprays, and other feminine hygiene products that have deodorants. · After going to the bathroom, wipe from front to back. When should you call for help? Call your doctor now or seek immediate medical care if:    · Symptoms such as fever, chills, nausea, or vomiting get worse or appear for the first time.     · You have new pain in your back just below your rib cage. This is called flank pain.     · There is new blood or pus in your urine.     · You have any problems with your antibiotic medicine.    Watch closely for changes in your health, and be sure to contact your doctor if:    · You are not getting better after taking an antibiotic for 2 days.     · Your symptoms go away but then come back. Where can you learn more? Go to http://kieran-joe.info/. Enter Y012 in the search box to learn more about \"Urinary Tract Infection in Women: Care Instructions. \"  Current as of: March 21, 2018  Content Version: 11.8  © 4744-2722 Healthwise, Incorporated. Care instructions adapted under license by [employer] (which disclaims liability or warranty for this information). If you have questions about a medical condition or this instruction, always ask your healthcare professional. [address] any warranty or liability for your use of this information.

## 2018-11-06 LAB
BACTERIA UR CULT: ABNORMAL
BACTERIA UR CULT: ABNORMAL

## 2018-12-24 ENCOUNTER — OFFICE VISIT (OUTPATIENT)
Dept: URGENT CARE | Age: 58
End: 2018-12-24

## 2018-12-24 VITALS
WEIGHT: 237 LBS | SYSTOLIC BLOOD PRESSURE: 137 MMHG | BODY MASS INDEX: 38.09 KG/M2 | DIASTOLIC BLOOD PRESSURE: 66 MMHG | OXYGEN SATURATION: 97 % | RESPIRATION RATE: 16 BRPM | TEMPERATURE: 98.8 F | HEART RATE: 66 BPM | HEIGHT: 66 IN

## 2018-12-24 DIAGNOSIS — M65.4 TENDINITIS, DE QUERVAIN'S: Primary | ICD-10-CM

## 2018-12-24 DIAGNOSIS — M79.642 PAIN OF LEFT HAND: ICD-10-CM

## 2018-12-24 NOTE — LETTER
[address]Wu [place] [address] 
[phone] Work/School Note Date: 12/24/2018 To Whom It May concern: 
 
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 Sincerely, Myron Pederson MD

## 2018-12-24 NOTE — PROGRESS NOTES
Hand Pain   This is a chronic problem. Episode onset: Reports persistent left thumb on the lateral aspect left thumb for \"a long time\"; works on the computer and register doing repetitive movements, denies numbness, tingling, weakness, shooting pain. The problem occurs constantly. The problem has not changed since onset. Pertinent negatives include no chest pain and no shortness of breath. She has tried nothing for the symptoms. Past Medical History:   Diagnosis Date    Bronchitis         Past Surgical History:   Procedure Laterality Date    HX BUNIONECTOMY Bilateral     x2 both feet    HX ORTHOPAEDIC      right hand ligament repair         Family History   Problem Relation Age of Onset    Elevated Lipids Mother     Cancer Father         pancreatic s/p whipple    Hypertension Father         Social History     Socioeconomic History    Marital status: SINGLE     Spouse name: Not on file    Number of children: Not on file    Years of education: Not on file    Highest education level: Not on file   Social Needs    Financial resource strain: Not on file    Food insecurity - worry: Not on file    Food insecurity - inability: Not on file   [email or web address] needs - medical: Not on file   [email or web address] needs - non-medical: Not on file   Occupational History    Not on file   Tobacco Use    Smoking status: Former Smoker     Packs/day: 0.25     Years: 10.00     Pack years: 2.50    Smokeless tobacco: Never Used   Substance and Sexual Activity    Alcohol use: Yes     Alcohol/week: 0.5 oz     Types: 1 Glasses of wine per week    Drug use: Yes     Types: OTC, Prescription    Sexual activity: Yes     Partners: Male     Comment: rarely   Other Topics Concern    Not on file   Social History Narrative    Not on file                ALLERGIES: Flagyl [metronidazole]; Iodine; and Seafood [shellfish containing products]    Review of Systems   Constitutional: Negative for chills and fever.    Respiratory: Negative for shortness of breath and wheezing. Cardiovascular: Negative for chest pain and palpitations. Musculoskeletal: Positive for arthralgias. Negative for myalgias. Skin: Negative for rash. Neurological: Negative for weakness and numbness. Hematological: Negative for adenopathy. Vitals:    12/24/18 1352   BP: 137/66   Pulse: 66   Resp: 16   Temp: 98.8 °F (37.1 °C)   SpO2: 97%   Weight: 237 lb (107.5 kg)   Height: 5' 6\" (1.676 m)       Physical Exam   Constitutional: She appears well-developed and well-nourished. No distress. Musculoskeletal:        Left hand: She exhibits tenderness and bony tenderness. She exhibits normal range of motion, normal two-point discrimination, normal capillary refill, no deformity, no laceration and no swelling. Normal sensation noted. Normal strength noted. Hands:  Neurological: She is alert. Skin: She is not diaphoretic. Psychiatric: She has a normal mood and affect. Her behavior is normal. Judgment and thought content normal.   Nursing note and vitals reviewed. MDM    ICD-10-CM ICD-9-CM    1. Tendinitis, de Quervain's M65.4 727.04 THUMB SPICA   2. Pain of left hand M79.642 729.5 XR HAND LT MIN 3 V     OTC NSAIDs  Spica    The patients condition was discussed with the patient and they understand. The patient is to follow up with PCP/ortho. XR Results (most recent):  Results from Appointment encounter on 12/24/18   XR HAND LT MIN 3 V    Narrative EXAM: XR HAND LT MIN 3 V    INDICATION: Left hand pain. COMPARISON: None. FINDINGS: Three views of the left hand demonstrate no fracture, dislocation or  other acute osseous or articular abnormality. The soft tissues are within normal  limits. Impression IMPRESSION: No acute abnormality.              Procedures

## 2018-12-24 NOTE — PATIENT INSTRUCTIONS
Marcial Springer Tenosynovitis: Care Instructions  Your Care Instructions  Marcial Springer (say \"Dereck\") tenosynovitis is a problem that makes the bottom of your thumb and the side of your wrist hurt. When you have de Quervain's, the ropey fiber (tendon) that helps move your thumb away from your fingers becomes swollen. You may have pain when you move your wrist or pick things up. You may hear a creaking sound when you move your wrist or thumb. Symptoms often get better in a few weeks with home care. Your doctor may want you to start some gentle stretching exercises once your symptoms are gone. Sometimes treatment with an injection or surgery is needed. Follow-up care is a key part of your treatment and safety. Be sure to make and go to all appointments, and call your doctor if you are having problems. It's also a good idea to know your test results and keep a list of the medicines you take. How can you care for yourself at home? · Until your symptoms are better, stop the activities that caused the pain. · Avoid moving the hand and wrist that hurt. · Follow your doctor's directions for wearing a splint to keep your thumb and wrist from moving. · Try ice or heat. ? Put ice or a cold pack on your thumb and wrist for 10 to 20 minutes at a time. Put a thin cloth between the ice and your skin. ? You can use heat for 20 to 30 minutes, 2 or 3 times a day. Try using a heating pad, hot shower, or hot pack. · Ask your doctor if you can take an over-the-counter pain medicine, such as acetaminophen (Tylenol), ibuprofen (Advil, Motrin), or naproxen (Aleve). Be safe with medicines. Read and follow all instructions on the label. When should you call for help?   Watch closely for changes in your health, and be sure to contact your doctor if:    · You have new or worse pain.     · You have new or worse numbness or tingling in your hand or fingers.     · Your hand feels weaker.     · You do not get better as expected. Where can you learn more? Go to http://kieran-joe.info/. Enter N310 in the search box to learn more about \"De Quervain's Tenosynovitis: Care Instructions. \"  Current as of: November 29, 2017  Content Version: 11.8  © 8069-4518 [employer]. Care instructions adapted under license by [employer] (which disclaims liability or warranty for this information). If you have questions about a medical condition or this instruction, always ask your healthcare professional. [address] any warranty or liability for your use of this information. Hudson Ambrosioe Disease: Exercises  Your Care Instructions  Here are some examples of typical rehabilitation exercises for your condition. Start each exercise slowly. Ease off the exercise if you start to have pain. Your doctor or your physical or occupational therapist will tell you when you can start these exercises and which ones will work best for you. How to do the exercises  Thumb lifts    1. Place your hand on a flat surface, with your palm up. 2. Lift your thumb away from your palm to make a \"C\" shape. 3. Hold for about 6 seconds. 4. Repeat 8 to 12 times. Passive thumb MP flexion    1. Hold your hand in front of you, and turn your hand so your little finger faces down and your thumb faces up. (Your hand should be in the position used for shaking someone's hand.) You may also rest your hand on a flat surface. 2. Use the fingers on your other hand to bend your thumb down at the point where your thumb connects to your palm. 3. Hold for at least 15 to 30 seconds. 4. Repeat 2 to 4 times. Finkelstein stretch    1. Hold your arms out in front of you. (Your hand should be in the position used for shaking someone's hand.)  2. Bend your thumb toward your palm.   3. Use your other hand to gently stretch your thumb and wrist downward until you feel the stretch on the thumb side of your wrist.  4. Hold for at least 15 to 30 seconds. 5. Repeat 2 to 4 times. Resisted ulnar deviation    1. Sit leaning forward with your legs slightly spread and your elbow on your thigh. 2. Grasp one end of the band with your palm down, and step on the other end with the foot opposite the hand holding the band. 3. Slowly bend your wrist sideways and away from your knee. 4. Repeat 8 to 12 times. Follow-up care is a key part of your treatment and safety. Be sure to make and go to all appointments, and call your doctor if you are having problems. It's also a good idea to know your test results and keep a list of the medicines you take. Where can you learn more? Go to http://kieran-joe.info/. Enter P346 in the search box to learn more about \"De Quervain's Disease: Exercises. \"  Current as of: November 29, 2017  Content Version: 11.8  © 1391-0705 [employer]. Care instructions adapted under license by [employer] (which disclaims liability or warranty for this information). If you have questions about a medical condition or this instruction, always ask your healthcare professional. [address] any warranty or liability for your use of this information.

## 2019-02-20 LAB
ALBUMIN SERPL-MCNC: 3.9 G/DL (ref 3.5–5.5)
ALBUMIN/GLOB SERPL: 1.4 {RATIO} (ref 1.2–2.2)
ALP SERPL-CCNC: 67 IU/L (ref 39–117)
ALT SERPL-CCNC: 26 IU/L (ref 0–32)
AST SERPL-CCNC: 19 IU/L (ref 0–40)
BILIRUB SERPL-MCNC: 0.8 MG/DL (ref 0–1.2)
BUN SERPL-MCNC: 15 MG/DL (ref 6–24)
BUN/CREAT SERPL: 19 (ref 9–23)
CALCIUM SERPL-MCNC: 8.9 MG/DL (ref 8.7–10.2)
CHLORIDE SERPL-SCNC: 106 MMOL/L (ref 96–106)
CHOLEST SERPL-MCNC: 175 MG/DL (ref 100–199)
CO2 SERPL-SCNC: 21 MMOL/L (ref 20–29)
CREAT SERPL-MCNC: 0.8 MG/DL (ref 0.57–1)
ERYTHROCYTE [DISTWIDTH] IN BLOOD BY AUTOMATED COUNT: 14 % (ref 12.3–15.4)
EST. AVERAGE GLUCOSE BLD GHB EST-MCNC: 117 MG/DL
GLOBULIN SER CALC-MCNC: 2.7 G/DL (ref 1.5–4.5)
GLUCOSE SERPL-MCNC: 97 MG/DL (ref 65–99)
HBA1C MFR BLD: 5.7 % (ref 4.8–5.6)
HCT VFR BLD AUTO: 38.6 % (ref 34–46.6)
HDLC SERPL-MCNC: 45 MG/DL
HGB BLD-MCNC: 12.5 G/DL (ref 11.1–15.9)
LDLC SERPL CALC-MCNC: 111 MG/DL (ref 0–99)
MCH RBC QN AUTO: 28.9 PG (ref 26.6–33)
MCHC RBC AUTO-ENTMCNC: 32.4 G/DL (ref 31.5–35.7)
MCV RBC AUTO: 89 FL (ref 79–97)
PLATELET # BLD AUTO: 206 X10E3/UL (ref 150–379)
POTASSIUM SERPL-SCNC: 4.1 MMOL/L (ref 3.5–5.2)
PROT SERPL-MCNC: 6.6 G/DL (ref 6–8.5)
RBC # BLD AUTO: 4.33 X10E6/UL (ref 3.77–5.28)
SODIUM SERPL-SCNC: 144 MMOL/L (ref 134–144)
TRIGL SERPL-MCNC: 94 MG/DL (ref 0–149)
TSH SERPL DL<=0.005 MIU/L-ACNC: 3.75 UIU/ML (ref 0.45–4.5)
VLDLC SERPL CALC-MCNC: 19 MG/DL (ref 5–40)
WBC # BLD AUTO: 4.6 X10E3/UL (ref 3.4–10.8)

## 2019-03-28 ENCOUNTER — OFFICE VISIT (OUTPATIENT)
Dept: URGENT CARE | Age: 59
End: 2019-03-28

## 2019-03-28 VITALS
HEIGHT: 66 IN | HEART RATE: 81 BPM | RESPIRATION RATE: 16 BRPM | WEIGHT: 237 LBS | TEMPERATURE: 98.7 F | DIASTOLIC BLOOD PRESSURE: 80 MMHG | BODY MASS INDEX: 38.09 KG/M2 | OXYGEN SATURATION: 98 % | SYSTOLIC BLOOD PRESSURE: 168 MMHG

## 2019-03-28 DIAGNOSIS — J32.9 SINUSITIS, UNSPECIFIED CHRONICITY, UNSPECIFIED LOCATION: Primary | ICD-10-CM

## 2019-03-28 RX ORDER — AZITHROMYCIN 250 MG/1
TABLET, FILM COATED ORAL
Qty: 6 TAB | Refills: 0 | Status: SHIPPED | OUTPATIENT
Start: 2019-03-28 | End: 2019-05-18

## 2019-03-28 RX ORDER — PREDNISONE 20 MG/1
TABLET ORAL
Qty: 6 TAB | Refills: 0 | Status: SHIPPED | OUTPATIENT
Start: 2019-03-28 | End: 2019-05-18

## 2019-03-28 RX ORDER — TRIAMCINOLONE ACETONIDE 55 UG/1
2 SPRAY, METERED NASAL DAILY
Qty: 1 BOTTLE | Refills: 0 | Status: SHIPPED | OUTPATIENT
Start: 2019-03-28 | End: 2019-05-18

## 2019-03-28 NOTE — LETTER
NOTIFICATION RETURN TO WORK / SCHOOL 
 
3/28/2019 8:08 PM 
 
Ms. Deedee King [address] [place] [phone] To Whom It May Concern: 
 
Kati Omer is currently under the care of [address] [place] [address]. She will return to work/school on: 03/30/2019 If there are questions or concerns please have the patient contact our office. Sincerely, GHE PROVIDER

## 2019-03-29 NOTE — PATIENT INSTRUCTIONS
RX printed for antibiotic; please wait 4-5 days to see if improves with other medications first.       Sinusitis: Care Instructions  Your Care Instructions    Sinusitis is an infection of the lining of the sinus cavities in your head. Sinusitis often follows a cold. It causes pain and pressure in your head and face. In most cases, sinusitis gets better on its own in 1 to 2 weeks. But some mild symptoms may last for several weeks. Sometimes antibiotics are needed. Follow-up care is a key part of your treatment and safety. Be sure to make and go to all appointments, and call your doctor if you are having problems. It's also a good idea to know your test results and keep a list of the medicines you take. How can you care for yourself at home? · Take an over-the-counter pain medicine, such as acetaminophen (Tylenol), ibuprofen (Advil, Motrin), or naproxen (Aleve). Read and follow all instructions on the label. · If the doctor prescribed antibiotics, take them as directed. Do not stop taking them just because you feel better. You need to take the full course of antibiotics. · Be careful when taking over-the-counter cold or flu medicines and Tylenol at the same time. Many of these medicines have acetaminophen, which is Tylenol. Read the labels to make sure that you are not taking more than the recommended dose. Too much acetaminophen (Tylenol) can be harmful. · Breathe warm, moist air from a steamy shower, a hot bath, or a sink filled with hot water. Avoid cold, dry air. Using a humidifier in your home may help. Follow the directions for cleaning the machine. · Use saline (saltwater) nasal washes to help keep your nasal passages open and wash out mucus and bacteria. You can buy saline nose drops at a grocery store or drugstore. Or you can make your own at home by adding 1 teaspoon of salt and 1 teaspoon of baking soda to 2 cups of distilled water.  If you make your own, fill a bulb syringe with the solution, insert the tip into your nostril, and squeeze gently. [employer] your nose. · Put a hot, wet towel or a warm gel pack on your face 3 or 4 times a day for 5 to 10 minutes each time. · Try a decongestant nasal spray like oxymetazoline (Afrin). Do not use it for more than 3 days in a row. Using it for more than 3 days can make your congestion worse. When should you call for help? Call your doctor now or seek immediate medical care if:    · You have new or worse swelling or redness in your face or around your eyes.     · You have a new or higher fever.    Watch closely for changes in your health, and be sure to contact your doctor if:    · You have new or worse facial pain.     · The mucus from your nose becomes thicker (like pus) or has new blood in it.     · You are not getting better as expected. Where can you learn more? Go to http://kieran-joe.info/. Enter P972 in the search box to learn more about \"Sinusitis: Care Instructions. \"  Current as of: March 27, 2018  Content Version: 11.9  © 9308-6408 [employer]wise, Incorporated. Care instructions adapted under license by [employer] (which disclaims liability or warranty for this information). If you have questions about a medical condition or this instruction, always ask your healthcare professional. [address] any warranty or liability for your use of this information.

## 2019-03-29 NOTE — PROGRESS NOTES
Here for sinus infection  Wants z pack \"its the only thin that works\"  3 days of nasal congestion and sinus pressure without any fevers, chills, cough or severe headache  Hasnt tried any other meds  Overall unchanged           Past Medical History:   Diagnosis Date    Bronchitis         Past Surgical History:   Procedure Laterality Date    HX BUNIONECTOMY Bilateral     x2 both feet    HX ORTHOPAEDIC      right hand ligament repair         Family History   Problem Relation Age of Onset    Elevated Lipids Mother     Cancer Father         pancreatic s/p whipple    Hypertension Father         Social History     Socioeconomic History    Marital status: SINGLE     Spouse name: Not on file    Number of children: Not on file    Years of education: Not on file    Highest education level: Not on file   Occupational History    Not on file   Social Needs    Financial resource strain: Not on file    Food insecurity:     Worry: Not on file     Inability: Not on file    Transportation needs:     Medical: Not on file     Non-medical: Not on file   Tobacco Use    Smoking status: Former Smoker     Packs/day: 0.25     Years: 10.00     Pack years: 2.50    Smokeless tobacco: Never Used   Substance and Sexual Activity    Alcohol use:  Yes     Alcohol/week: 0.5 oz     Types: 1 Glasses of wine per week    Drug use: Yes     Types: OTC, Prescription    Sexual activity: Yes     Partners: Male     Comment: rarely   Lifestyle    Physical activity:     Days per week: Not on file     Minutes per session: Not on file    Stress: Not on file   Relationships    Social connections:     Talks on phone: Not on file     Gets together: Not on file     Attends Congregation service: Not on file     Active member of club or organization: Not on file     Attends meetings of clubs or organizations: Not on file     Relationship status: Not on file    Intimate partner violence:     Fear of current or ex partner: Not on file     Emotionally abused: Not on file     Physically abused: Not on file     Forced sexual activity: Not on file   Other Topics Concern    Not on file   Social History Narrative    Not on file                ALLERGIES: Flagyl [metronidazole]; Iodine; and Seafood [shellfish containing products]    Review of Systems   Skin: Negative for rash. Neurological: Negative for dizziness. All other systems reviewed and are negative. Vitals:    19 194   BP: 168/80   Pulse: 81   Resp: 16   Temp: 98.7 °F (37.1 °C)   SpO2: 98%   Weight: 237 lb (107.5 kg)   Height: 5' 6\" (1.676 m)       Physical Exam   Constitutional: She is oriented to person, place, and time. No distress. HENT:   Mouth/Throat: No oropharyngeal exudate. Mild decrease in nasal patency. No purulent discharge. Eyes: Pupils are equal, round, and reactive to light. Conjunctivae and EOM are normal.   Neck: Normal range of motion. Cardiovascular: Normal rate, regular rhythm and normal heart sounds. Pulmonary/Chest: Effort normal and breath sounds normal. No respiratory distress. She has no wheezes. She has no rales. Lymphadenopathy:     She has no cervical adenopathy. Neurological: She is alert and oriented to person, place, and time. Skin: She is not diaphoretic. Psychiatric: She has a normal mood and affect. Her behavior is normal. Thought content normal.       MDM     Differential Diagnosis; Clinical Impression; Plan:       CLINICAL IMPRESSION:  (J32.9) Sinusitis, unspecified chronicity, unspecified location  (primary encounter diagnosis)    Orders Placed This Encounter      triamcinolone (NASACORT AQ) 55 mcg nasal inhaler          Si Sprays by Both Nostrils route daily. Dispense:  1 Bottle          Refill:  0      predniSONE (DELTASONE) 20 mg tablet          Sig: Take 2 tabs by mouth one time daily with food for 3 days.           Dispense:  6 Tab          Refill:  0         (Advised to NOT take antibiotic today; in detail described bacterial process) Rx printed and she is to hold for next 5-6 days to see if improves      azithromycin (ZITHROMAX) 250 mg tablet          Sig: Take 2 tablets on day 1 then 1 tablet per day for remaining 4 days. Take by mouth. Dispense:  6 Tab          Refill:  0      Plan:  Suspect allergies  See above orders  Patient educated on antibiotic overuse and still demanded Rx; I have advised not to take      We have reviewed concerning signs/symptoms, normal vs abnormal progression of medical condition and when to seek immediate medical attention. Schedule with PCP or Urgent Care immediately for worsening or new symptoms. See your PCP if there is not at least some improvement in symptoms within the next 1 week  You should see your PCP for updates on your routine health maintenance.              Procedures

## 2019-05-17 ENCOUNTER — TELEPHONE (OUTPATIENT)
Dept: INTERNAL MEDICINE CLINIC | Age: 59
End: 2019-05-17

## 2019-05-18 ENCOUNTER — OFFICE VISIT (OUTPATIENT)
Dept: URGENT CARE | Age: 59
End: 2019-05-18

## 2019-05-24 ENCOUNTER — TELEPHONE (OUTPATIENT)
Dept: INTERNAL MEDICINE CLINIC | Age: 59
End: 2019-05-24

## 2019-05-24 NOTE — TELEPHONE ENCOUNTER
Patient states she needs a call back in reference to getting an appt sooner for body aches & possible Neurological problems. No appts in time frame. Please call.  Thank you

## 2019-05-24 NOTE — TELEPHONE ENCOUNTER
Called, spoke to pt. Two identifiers confirmed. Pt c/o general body aches. Pt thinks she should go to a neurologist because she cannot figure out what is going on with her. Pt unable to provide any details on s/s. Appointment scheduled for 5/31. Pt verbalized understanding of information discussed w/ no further questions at this time.

## 2019-05-31 ENCOUNTER — OFFICE VISIT (OUTPATIENT)
Dept: INTERNAL MEDICINE CLINIC | Age: 59
End: 2019-05-31

## 2019-05-31 VITALS
SYSTOLIC BLOOD PRESSURE: 118 MMHG | OXYGEN SATURATION: 96 % | RESPIRATION RATE: 16 BRPM | BODY MASS INDEX: 38.41 KG/M2 | HEIGHT: 66 IN | DIASTOLIC BLOOD PRESSURE: 73 MMHG | HEART RATE: 57 BPM | TEMPERATURE: 99.5 F | WEIGHT: 239 LBS

## 2019-05-31 DIAGNOSIS — M79.621 AXILLARY PAIN, RIGHT: Primary | ICD-10-CM

## 2019-05-31 NOTE — PROGRESS NOTES
1. Have you been to the ER, urgent care clinic since your last visit? Hospitalized since your last visit? no  2. Have you seen or consulted any other health care providers outside of the [address] [place] [address] since your last visit? Include any pap smears or colon screening.  no

## 2019-05-31 NOTE — PROGRESS NOTES
SUBJECTIVE  Ms. Franca Nicole presents today acutely for     Chief Complaint   Patient presents with    Generalized Body Aches     pt c.o sensation under R arm and to R side of chest; pt not sure if its a nerve issue, pt states that she implants that she will get checked out next week     C/o aching in R axilla. She is worried about her implants, and also lymph nodes. She went through \"peroxide therapy\" about a month ago, \"to boost my immune system. \" Dr. Luis Carlos Augustin in [facility]. She has a history of recurring sinus infections. She has breast implants, and \"I'm going to get them checked out\" by plastic surgeon on [facility]. OBJECTIVE  Visit Vitals  /73 (BP 1 Location: Left arm, BP Patient Position: Sitting)   Pulse (!) 57   Temp 99.5 °F (37.5 °C) (Oral)   Resp 16   Ht 5' 6\" (1.676 m)   Wt 239 lb (108.4 kg)   SpO2 96%   BMI 38.58 kg/m²     Gen: Pleasant 66 y.o.  female in NAD.    HEART: RRR, No M/G/R.   LUNGS: CTAB No W/R.   ABDOMEN: S, NT, ND, BS+.   EXTREMITIES: Warm. MUSCULOSKELETAL: Normal ROM, muscle strength 5/5 all groups. SKIN: Warm. Dry. No rashes or other lesions noted. ASSESSMENT / PLAN    ICD-10-CM ICD-9-CM    1. Axillary pain, right M79.621 729.5      I didn't appreciate lymph nodes. For now, reassured, likely muscular. Agree with getting implants checked out. I have reviewed with the patient details of the assessment and plan and all questions were answered. Relevant patient education was performed.

## 2020-09-29 ENCOUNTER — TELEPHONE (OUTPATIENT)
Dept: INTERNAL MEDICINE CLINIC | Age: 60
End: 2020-09-29

## 2020-09-29 NOTE — TELEPHONE ENCOUNTER
[facility] Front Office Pool               Caller's first and last name and relationship to patient (if not the patient): Pt   Best contact number: [phone]   Preferred date and time: Oct 13 or Oct 20   Scheduled appointment date and time: N/A   Reason for appointment: CPE   Details to clarify the request: Wants to know what PCP suggests as far as getting bloodwork done.

## 2020-09-30 NOTE — TELEPHONE ENCOUNTER
Called, spoke to pt. Two identifiers confirmed. VV scheduled for 10/20 @ [phone] with Dr. Rodas. Pt verbalized understanding of information discussed w/ no further questions at this time.

## 2020-10-20 ENCOUNTER — VIRTUAL VISIT (OUTPATIENT)
Dept: INTERNAL MEDICINE CLINIC | Age: 60
End: 2020-10-20
Payer: COMMERCIAL

## 2020-10-20 DIAGNOSIS — R73.03 PREDIABETES: ICD-10-CM

## 2020-10-20 DIAGNOSIS — Z00.00 ROUTINE GENERAL MEDICAL EXAMINATION AT A HEALTH CARE FACILITY: Primary | ICD-10-CM

## 2020-10-20 DIAGNOSIS — K63.5 POLYP OF COLON, UNSPECIFIED PART OF COLON, UNSPECIFIED TYPE: ICD-10-CM

## 2020-10-20 DIAGNOSIS — E66.01 MORBID OBESITY (HCC): ICD-10-CM

## 2020-10-20 PROCEDURE — 99443 PR PHYS/QHP TELEPHONE EVALUATION 21-30 MIN: CPT | Performed by: INTERNAL MEDICINE

## 2020-10-20 NOTE — PATIENT INSTRUCTIONS
This is an established visit conducted via telemedicine. The patient has been instructed that this meets HIPAA criteria and acknowledges and agrees to this method of visitation.  
 
Amari Bains LPN 
70/60/60 
41:04 AM

## 2020-10-20 NOTE — PROGRESS NOTES
HISTORY OF PRESENT ILLNESS  Josue Sen is a 64 y.o. female. HPI   VV via telephone encounter x 21 minutes d/t covid 19 pandemic    An electronic signature was used to authenticate this note. Here for CPE and fu prediabetes, obesity, BERTIN on cpap  Mild mitral regurgitation FH colon cancer/colon polyps 2017-Dr Shields but cancelled d/t covid 19  Stable weight   Walks a lot a work  Sees gyn MD-will see Dr Odette Edwards next week  Last mammogram-[facility]  Shannon Saldivar MD for skin checks--father die of melanoma  Last OV       F/u prediabetes, elevated blood pressure     Last visit:  Has had some tingling of bottom of right toe in the mornings  Taking a crm at compounding pharmacy for menopausal sxs  Will be seeing Dr. Jordan Kramer  Using cpap device for BERTIN  Weight has been stable per pt  Getting colonoscopy again in January   sees DERM Q 6 months for skin check-Dr. Renee Goodpasture        Treated for UTI at ER [facility] recently  Pt made an appt at [place]  [facility] to f/u UTI and frequent UTI          Patient Active Problem List    Diagnosis Date Noted    Severe obesity (BMI 35.0-39.9) 09/18/2018    Microscopic hematuria 09/18/2018    Prediabetes 11/08/2016    Colon polyp 10/08/2013    FH: colon cancer 10/08/2013    Abnormal CT of the abdomen 07/27/2012    Mitral valve disorders(424.0) 07/27/2012    Obesity 06/14/2011    BERTIN (obstructive sleep apnea) 06/14/2011    Allergic rhinitis 06/14/2011     Current Outpatient Medications   Medication Sig Dispense Refill    bioidentical hormones Take  by mouth.        Allergies   Allergen Reactions    Flagyl [Metronidazole] Hives    Iodine Nausea and Vomiting    Seafood [Shellfish Containing Products] Hives      Lab Results   Component Value Date/Time    WBC 4.6 02/19/2019 10:15 AM    HGB 12.5 02/19/2019 10:15 AM    HCT 38.6 02/19/2019 10:15 AM    PLATELET 264 20/87/3693 10:15 AM    MCV 89 02/19/2019 10:15 AM     Lab Results   Component Value Date/Time    Hemoglobin A1c 5.7 (H) 02/19/2019 10:15 AM    Hemoglobin A1c 5.5 09/19/2017 03:53 PM    Hemoglobin A1c 5.9 (H) 05/16/2017 09:33 AM    Glucose 97 02/19/2019 10:15 AM    Glucose  02/27/2015 10:09 AM    LDL, calculated 111 (H) 02/19/2019 10:15 AM    Creatinine 0.80 02/19/2019 10:15 AM      Lab Results   Component Value Date/Time    Cholesterol, total 175 02/19/2019 10:15 AM    HDL Cholesterol 45 02/19/2019 10:15 AM    LDL, calculated 111 (H) 02/19/2019 10:15 AM    Triglyceride 94 02/19/2019 10:15 AM     Lab Results   Component Value Date/Time    GFR est non-AA 83 02/19/2019 10:15 AM    GFR est AA 95 02/19/2019 10:15 AM    Creatinine 0.80 02/19/2019 10:15 AM    BUN 15 02/19/2019 10:15 AM    Sodium 144 02/19/2019 10:15 AM    Potassium 4.1 02/19/2019 10:15 AM    Chloride 106 02/19/2019 10:15 AM    CO2 21 02/19/2019 10:15 AM     Lab Results   Component Value Date/Time    TSH 3.750 02/19/2019 10:15 AM    T4, Free 0.99 11/08/2016 04:09 PM         ROS    Physical Exam    ASSESSMENT and PLAN  Diagnoses and all orders for this visit:    1. Routine general medical examination at a health care facility  -     CBC W/O DIFF  -     METABOLIC PANEL, COMPREHENSIVE  -     LIPID PANEL  -     TSH 3RD GENERATION  -     HEMOGLOBIN A1C WITH EAG   Will se gyn MD and get  mammogram and PAP   Recommended flu shot  2. Prediabetes  -     HEMOGLOBIN A1C WITH EAG   Weight reduction needed-discussed  3. Morbid obesity ([address]   I have reviewed/discussed the above normal BMI with the patient. I have recommended the following interventions: dietary management education, guidance, and counseling and encourage exercise . [address].  Polyp of colon, unspecified part of colon, unspecified type  -     REFERRAL TO GASTROENTEROLOGY        RTC 1 year of sooner prn

## 2021-10-20 ENCOUNTER — OFFICE VISIT (OUTPATIENT)
Dept: INTERNAL MEDICINE CLINIC | Age: 61
End: 2021-10-20
Payer: COMMERCIAL

## 2021-10-20 VITALS
WEIGHT: 244 LBS | DIASTOLIC BLOOD PRESSURE: 72 MMHG | OXYGEN SATURATION: 98 % | SYSTOLIC BLOOD PRESSURE: 120 MMHG | TEMPERATURE: 98.2 F | RESPIRATION RATE: 16 BRPM | HEART RATE: 68 BPM | HEIGHT: 64 IN | BODY MASS INDEX: 41.66 KG/M2

## 2021-10-20 DIAGNOSIS — G47.33 OSA ON CPAP: ICD-10-CM

## 2021-10-20 DIAGNOSIS — C50.911 MALIGNANT NEOPLASM OF RIGHT FEMALE BREAST, UNSPECIFIED ESTROGEN RECEPTOR STATUS, UNSPECIFIED SITE OF BREAST (HCC): ICD-10-CM

## 2021-10-20 DIAGNOSIS — E66.01 MORBID OBESITY (HCC): ICD-10-CM

## 2021-10-20 DIAGNOSIS — Z00.00 ROUTINE GENERAL MEDICAL EXAMINATION AT A HEALTH CARE FACILITY: Primary | ICD-10-CM

## 2021-10-20 DIAGNOSIS — Z99.89 OSA ON CPAP: ICD-10-CM

## 2021-10-20 PROCEDURE — 99396 PREV VISIT EST AGE 40-64: CPT | Performed by: INTERNAL MEDICINE

## 2021-10-20 NOTE — PATIENT INSTRUCTIONS
Office Policies    Phone calls/patient messages:            Please allow up to 24 hours for someone in the office to contact you about your call or message. Be mindful your provider may be out of the office or your message may require further review. We encourage you to use Live[employer] for your messages as this is a faster, more efficient way to communicate with our office                         Medication Refills:            Prescription medications require 48-72 business hours to process. We encourage you to use LiveTop for your refills. For controlled medications: Please allow 72 business hours to process. Certain medications may require you to  a written prescription at our office. NO narcotic/controlled medications will be prescribed after 4pm Monday through Friday or on weekends              Form/Paperwork Completion:            Please note a $25 fee may incur for all paperwork for completed by our providers. We ask that you allow 7-10 business days. Pre-payment is due prior to picking up/faxing the completed form. You may also download your forms to LiveTop to have your doctor print off.

## 2021-10-20 NOTE — PROGRESS NOTES
HISTORY OF PRESENT ILLNESS  Alexandra Zapata is a 64 y.o. female. HPI       Here for CPE and fu prediabetes, obesity, BERTIN on cpap  Mild mitral regurgitation FH colon cancer/colon polyps 2017-Dr Shields but cancelled d/t covid 19  Had recent colonoscopy last month--tics , no polyps-Dr Martin Olguin     Dx with right breast cancer this year--s/p lumpectomy and XRT-onc MD at Dr Clarence Byrd and Dr Dennie Chow. ER positive  Pt declined AI  walks a lot on the job at [employer]  Weight up a few lbs this year  Wanda Bustillo MD  No cp or sob  Nonsmoker , occ etoh  Single . No children    Last OV  Stable weight   Walks a lot a work  Sees gyn MD-will see Dr Adelso Gooden next week  [facility]  Wanda Bustillo MD for skin checks--father die of melanoma    Patient Active Problem List    Diagnosis Date Noted    Severe obesity (BMI 35.0-39.9) 09/18/2018    Microscopic hematuria 09/18/2018    Prediabetes 11/08/2016    Colon polyp 10/08/2013    FH: colon cancer 10/08/2013    Abnormal CT of the abdomen 07/27/2012    Mitral valve disorders(424.0) 07/27/2012    Obesity 06/14/2011    BERTIN (obstructive sleep apnea) 06/14/2011    Allergic rhinitis 06/14/2011     Current Outpatient Medications   Medication Sig Dispense Refill    bioidentical hormones Take  by mouth. (Patient not taking: Reported on 10/20/2021)       Allergies   Allergen Reactions    Flagyl [Metronidazole] Hives    Iodine Nausea and Vomiting    Seafood [Shellfish Containing Products] Hives     Social History     Tobacco Use    Smoking status: Former Smoker     Packs/day: 0.25     Years: 10.00     Pack years: 2.50    Smokeless tobacco: Never Used   Substance Use Topics    Alcohol use:  Yes     Alcohol/week: 0.8 standard drinks     Types: 1 Glasses of wine per week     Comment: 2-4 month      Lab Results   Component Value Date/Time    WBC 4.6 02/19/2019 10:15 AM    HGB 12.5 02/19/2019 10:15 AM    HCT 38.6 02/19/2019 10:15 AM    PLATELET 316 68/83/7387 10:15 AM    MCV 89 02/19/2019 10:15 AM     Lab Results   Component Value Date/Time    Hemoglobin A1c 5.7 (H) 02/19/2019 10:15 AM    Hemoglobin A1c 5.5 09/19/2017 03:53 PM    Hemoglobin A1c 5.9 (H) 05/16/2017 09:33 AM    Glucose 97 02/19/2019 10:15 AM    Glucose  02/27/2015 10:09 AM    LDL, calculated 111 (H) 02/19/2019 10:15 AM    Creatinine 0.80 02/19/2019 10:15 AM      Lab Results   Component Value Date/Time    Cholesterol, total 175 02/19/2019 10:15 AM    HDL Cholesterol 45 02/19/2019 10:15 AM    LDL, calculated 111 (H) 02/19/2019 10:15 AM    Triglyceride 94 02/19/2019 10:15 AM     Lab Results   Component Value Date/Time    GFR est non-AA 83 02/19/2019 10:15 AM    GFR est AA 95 02/19/2019 10:15 AM    Creatinine 0.80 02/19/2019 10:15 AM    BUN 15 02/19/2019 10:15 AM    Sodium 144 02/19/2019 10:15 AM    Potassium 4.1 02/19/2019 10:15 AM    Chloride 106 02/19/2019 10:15 AM    CO2 21 02/19/2019 10:15 AM     Lab Results   Component Value Date/Time    TSH 3.750 02/19/2019 10:15 AM    T4, Free 0.99 11/08/2016 04:09 PM      Lab Results   Component Value Date/Time    Glucose 97 02/19/2019 10:15 AM    Glucose  02/27/2015 10:09 AM         Review of Systems   Constitutional: Negative for chills, fever, malaise/fatigue and weight loss. HENT: Negative. Eyes: Negative for blurred vision and double vision. Respiratory: Negative for cough and shortness of breath. Cardiovascular: Negative for chest pain and palpitations. Gastrointestinal: Negative for abdominal pain, blood in stool, constipation, diarrhea, melena, nausea and vomiting. Genitourinary: Negative for dysuria, frequency, hematuria and urgency. Musculoskeletal: Negative for back pain, falls, joint pain and myalgias. Skin: Negative. Neurological: Negative for dizziness, tremors and headaches. Endo/Heme/Allergies: Negative. Psychiatric/Behavioral: Negative. Physical Exam  Vitals and nursing note reviewed.    Constitutional:       Appearance: Normal appearance. She is well-developed. She is obese. Comments: Appears stated age   HENT:      Right Ear: Tympanic membrane normal.      Left Ear: Tympanic membrane normal.   Cardiovascular:      Rate and Rhythm: Normal rate and regular rhythm. Heart sounds: Normal heart sounds. No murmur heard. No friction rub. No gallop. Pulmonary:      Effort: Pulmonary effort is normal. No respiratory distress. Breath sounds: Normal breath sounds. No wheezing. Abdominal:      General: Bowel sounds are normal.      Palpations: Abdomen is soft. Genitourinary:     General: Normal vulva. Musculoskeletal:         General: Normal range of motion. Cervical back: Normal range of motion. Neurological:      General: No focal deficit present. Mental Status: She is alert. ASSESSMENT and PLAN  Diagnoses and all orders for this visit:    1. Routine general medical examination at a health care facility  -     CBC W/O DIFF; Future  -     HEMOGLOBIN A1C WITH EAG; Future  -     METABOLIC PANEL, COMPREHENSIVE; Future  -     LIPID PANEL; Future  -     TSH 3RD GENERATION; Future   Pt declines flu shot   Had shingrix   Weight reduction needed-discussed  2. BERTIN on CPAP   adherent  3. Malignant neoplasm of right female breast, unspecified estrogen receptor status, unspecified site of breast [facility])   S/p lumpectomy and xrt   Fu breast specialists  4. Morbid obesity ([address]   I have reviewed/discussed the above normal BMI with the patient. I have recommended the following interventions: dietary management education, guidance, and counseling and encourage exercise . Luis Velarde Follow-up and Dispositions    · Return in about 1 year (around 10/20/2022) for cpe.

## 2021-10-21 LAB
ALBUMIN SERPL-MCNC: 3.8 G/DL (ref 3.5–5)
ALBUMIN/GLOB SERPL: 1.2 {RATIO} (ref 1.1–2.2)
ALP SERPL-CCNC: 69 U/L (ref 45–117)
ALT SERPL-CCNC: 44 U/L (ref 12–78)
ANION GAP SERPL CALC-SCNC: 6 MMOL/L (ref 5–15)
AST SERPL-CCNC: 25 U/L (ref 15–37)
BILIRUB SERPL-MCNC: 0.8 MG/DL (ref 0.2–1)
BUN SERPL-MCNC: 12 MG/DL (ref 6–20)
BUN/CREAT SERPL: 16 (ref 12–20)
CALCIUM SERPL-MCNC: 9.1 MG/DL (ref 8.5–10.1)
CHLORIDE SERPL-SCNC: 107 MMOL/L (ref 97–108)
CHOLEST SERPL-MCNC: 194 MG/DL
CO2 SERPL-SCNC: 28 MMOL/L (ref 21–32)
CREAT SERPL-MCNC: 0.73 MG/DL (ref 0.55–1.02)
ERYTHROCYTE [DISTWIDTH] IN BLOOD BY AUTOMATED COUNT: 13 % (ref 11.5–14.5)
EST. AVERAGE GLUCOSE BLD GHB EST-MCNC: 111 MG/DL
GLOBULIN SER CALC-MCNC: 3.1 G/DL (ref 2–4)
GLUCOSE SERPL-MCNC: 103 MG/DL (ref 65–100)
HBA1C MFR BLD: 5.5 % (ref 4–5.6)
HCT VFR BLD AUTO: 40 % (ref 35–47)
HDLC SERPL-MCNC: 53 MG/DL
HDLC SERPL: 3.7 {RATIO} (ref 0–5)
HGB BLD-MCNC: 12.9 G/DL (ref 11.5–16)
LDLC SERPL CALC-MCNC: 121.2 MG/DL (ref 0–100)
MCH RBC QN AUTO: 29.6 PG (ref 26–34)
MCHC RBC AUTO-ENTMCNC: 32.3 G/DL (ref 30–36.5)
MCV RBC AUTO: 91.7 FL (ref 80–99)
NRBC # BLD: 0 K/UL (ref 0–0.01)
NRBC BLD-RTO: 0 PER 100 WBC
PLATELET # BLD AUTO: 179 K/UL (ref 150–400)
PMV BLD AUTO: 11.1 FL (ref 8.9–12.9)
POTASSIUM SERPL-SCNC: 4.4 MMOL/L (ref 3.5–5.1)
PROT SERPL-MCNC: 6.9 G/DL (ref 6.4–8.2)
RBC # BLD AUTO: 4.36 M/UL (ref 3.8–5.2)
SODIUM SERPL-SCNC: 141 MMOL/L (ref 136–145)
TRIGL SERPL-MCNC: 99 MG/DL (ref ?–150)
TSH SERPL DL<=0.05 MIU/L-ACNC: 3.31 UIU/ML (ref 0.36–3.74)
VLDLC SERPL CALC-MCNC: 19.8 MG/DL
WBC # BLD AUTO: 4.7 K/UL (ref 3.6–11)

## 2022-03-19 PROBLEM — R31.29 MICROSCOPIC HEMATURIA: Status: ACTIVE | Noted: 2018-09-18

## 2022-06-27 ENCOUNTER — TELEPHONE (OUTPATIENT)
Dept: INTERNAL MEDICINE CLINIC | Age: 62
End: 2022-06-27

## 2022-06-27 ENCOUNTER — DOCUMENTATION ONLY (OUTPATIENT)
Dept: INTERNAL MEDICINE CLINIC | Age: 62
End: 2022-06-27

## 2022-06-27 NOTE — TELEPHONE ENCOUNTER
----- Message from Vianey Buck sent at 6/27/2022 10:01 AM EDT -----  Subject: Appointment Request    Reason for Call: Routine Existing Condition Follow Up    QUESTIONS  Type of Appointment? Established Patient  Reason for appointment request? Available appointments did not meet   patient need  Additional Information for Provider? Pt is wanting to see the doctor asap   due to elevated BP. 159/78 and pulse of 72. Please call pt to schedule.  ---------------------------------------------------------------------------  --------------  CALL BACK INFO  What is the best way for the office to contact you? OK to leave message on   voicemail  Preferred Call Back Phone Number? [phone]  ---------------------------------------------------------------------------  --------------  SCRIPT ANSWERS  Relationship to Patient? Self  Is this follow up request related to routine Diabetes Management? No  Have you been diagnosed with COVID-19 in the past 10 days? No  (Service Expert  click yes below to proceed with [employer] As Usual   Scheduling)?  Yes

## 2022-06-27 NOTE — PROGRESS NOTES
Patient was notified that Dr. Xiomy Mullen was out of the office. The only soon appointments with other providers were virtual which she declined. Patient will be going to urgent care for her blood pressure concerns.

## 2024-04-15 ENCOUNTER — ANESTHESIA EVENT (OUTPATIENT)
Facility: HOSPITAL | Age: 64
End: 2024-04-15
Payer: COMMERCIAL

## 2024-04-15 ENCOUNTER — HOSPITAL ENCOUNTER (OUTPATIENT)
Facility: HOSPITAL | Age: 64
Setting detail: OUTPATIENT SURGERY
Discharge: HOME | End: 2024-04-15
Attending: INTERNAL MEDICINE | Admitting: INTERNAL MEDICINE
Payer: COMMERCIAL

## 2024-04-15 ENCOUNTER — ANESTHESIA (OUTPATIENT)
Facility: HOSPITAL | Age: 64
End: 2024-04-15
Payer: COMMERCIAL

## 2024-04-15 VITALS
TEMPERATURE: 98 F | BODY MASS INDEX: 39.79 KG/M2 | HEIGHT: 66 IN | RESPIRATION RATE: 15 BRPM | DIASTOLIC BLOOD PRESSURE: 78 MMHG | WEIGHT: 247.6 LBS | SYSTOLIC BLOOD PRESSURE: 155 MMHG | HEART RATE: 56 BPM | OXYGEN SATURATION: 97 %

## 2024-04-15 PROCEDURE — 3700000001 HC ADD 15 MINUTES (ANESTHESIA): Performed by: INTERNAL MEDICINE

## 2024-04-15 PROCEDURE — 7100000010 HC PHASE II RECOVERY - FIRST 15 MIN: Performed by: INTERNAL MEDICINE

## 2024-04-15 PROCEDURE — 3600007512: Performed by: INTERNAL MEDICINE

## 2024-04-15 PROCEDURE — 6370000000 HC RX 637 (ALT 250 FOR IP): Performed by: INTERNAL MEDICINE

## 2024-04-15 PROCEDURE — 3700000000 HC ANESTHESIA ATTENDED CARE: Performed by: INTERNAL MEDICINE

## 2024-04-15 PROCEDURE — C1889 IMPLANT/INSERT DEVICE, NOC: HCPCS | Performed by: INTERNAL MEDICINE

## 2024-04-15 PROCEDURE — C1713 ANCHOR/SCREW BN/BN,TIS/BN: HCPCS | Performed by: INTERNAL MEDICINE

## 2024-04-15 PROCEDURE — 2580000003 HC RX 258: Performed by: INTERNAL MEDICINE

## 2024-04-15 PROCEDURE — 2500000003 HC RX 250 WO HCPCS: Performed by: NURSE ANESTHETIST, CERTIFIED REGISTERED

## 2024-04-15 PROCEDURE — 88305 TISSUE EXAM BY PATHOLOGIST: CPT

## 2024-04-15 PROCEDURE — 3600007502: Performed by: INTERNAL MEDICINE

## 2024-04-15 PROCEDURE — 7100000011 HC PHASE II RECOVERY - ADDTL 15 MIN: Performed by: INTERNAL MEDICINE

## 2024-04-15 PROCEDURE — 6360000002 HC RX W HCPCS: Performed by: NURSE ANESTHETIST, CERTIFIED REGISTERED

## 2024-04-15 PROCEDURE — 2709999900 HC NON-CHARGEABLE SUPPLY: Performed by: INTERNAL MEDICINE

## 2024-04-15 DEVICE — WORKING LENGTH 235CM, WORKING CHANNEL 2.8MM
Type: IMPLANTABLE DEVICE | Site: ASCENDING COLON | Status: FUNCTIONAL
Brand: RESOLUTION 360 CLIP

## 2024-04-15 RX ORDER — SODIUM CHLORIDE 9 MG/ML
INJECTION, SOLUTION INTRAVENOUS CONTINUOUS
Status: DISCONTINUED | OUTPATIENT
Start: 2024-04-15 | End: 2024-04-15 | Stop reason: HOSPADM

## 2024-04-15 RX ORDER — SODIUM CHLORIDE 9 MG/ML
25 INJECTION, SOLUTION INTRAVENOUS PRN
Status: DISCONTINUED | OUTPATIENT
Start: 2024-04-15 | End: 2024-04-15 | Stop reason: HOSPADM

## 2024-04-15 RX ORDER — SODIUM CHLORIDE 0.9 % (FLUSH) 0.9 %
5-40 SYRINGE (ML) INJECTION PRN
Status: DISCONTINUED | OUTPATIENT
Start: 2024-04-15 | End: 2024-04-15 | Stop reason: HOSPADM

## 2024-04-15 RX ORDER — SIMETHICONE 40MG/0.6ML
SUSPENSION, DROPS(FINAL DOSAGE FORM)(ML) ORAL PRN
Status: DISCONTINUED | OUTPATIENT
Start: 2024-04-15 | End: 2024-04-15 | Stop reason: ALTCHOICE

## 2024-04-15 RX ORDER — SODIUM CHLORIDE 0.9 % (FLUSH) 0.9 %
5-40 SYRINGE (ML) INJECTION EVERY 12 HOURS SCHEDULED
Status: DISCONTINUED | OUTPATIENT
Start: 2024-04-15 | End: 2024-04-15 | Stop reason: HOSPADM

## 2024-04-15 RX ORDER — LIDOCAINE HYDROCHLORIDE 20 MG/ML
INJECTION, SOLUTION EPIDURAL; INFILTRATION; INTRACAUDAL; PERINEURAL PRN
Status: DISCONTINUED | OUTPATIENT
Start: 2024-04-15 | End: 2024-04-15 | Stop reason: SDUPTHER

## 2024-04-15 RX ADMIN — PROPOFOL 100 MG: 10 INJECTION, EMULSION INTRAVENOUS at 08:50

## 2024-04-15 RX ADMIN — PROPOFOL 30 MG: 10 INJECTION, EMULSION INTRAVENOUS at 09:03

## 2024-04-15 RX ADMIN — PROPOFOL 30 MG: 10 INJECTION, EMULSION INTRAVENOUS at 08:58

## 2024-04-15 RX ADMIN — PROPOFOL 50 MG: 10 INJECTION, EMULSION INTRAVENOUS at 09:20

## 2024-04-15 RX ADMIN — PROPOFOL 30 MG: 10 INJECTION, EMULSION INTRAVENOUS at 08:59

## 2024-04-15 RX ADMIN — PROPOFOL 30 MG: 10 INJECTION, EMULSION INTRAVENOUS at 09:10

## 2024-04-15 RX ADMIN — PROPOFOL 30 MG: 10 INJECTION, EMULSION INTRAVENOUS at 08:57

## 2024-04-15 RX ADMIN — PROPOFOL 30 MG: 10 INJECTION, EMULSION INTRAVENOUS at 08:56

## 2024-04-15 RX ADMIN — PROPOFOL 50 MG: 10 INJECTION, EMULSION INTRAVENOUS at 09:05

## 2024-04-15 RX ADMIN — PROPOFOL 50 MG: 10 INJECTION, EMULSION INTRAVENOUS at 09:16

## 2024-04-15 RX ADMIN — PROPOFOL 30 MG: 10 INJECTION, EMULSION INTRAVENOUS at 09:15

## 2024-04-15 RX ADMIN — PROPOFOL 50 MG: 10 INJECTION, EMULSION INTRAVENOUS at 09:18

## 2024-04-15 RX ADMIN — PROPOFOL 30 MG: 10 INJECTION, EMULSION INTRAVENOUS at 09:11

## 2024-04-15 RX ADMIN — PROPOFOL 30 MG: 10 INJECTION, EMULSION INTRAVENOUS at 08:53

## 2024-04-15 RX ADMIN — PROPOFOL 30 MG: 10 INJECTION, EMULSION INTRAVENOUS at 09:00

## 2024-04-15 RX ADMIN — PROPOFOL 30 MG: 10 INJECTION, EMULSION INTRAVENOUS at 09:14

## 2024-04-15 RX ADMIN — PROPOFOL 30 MG: 10 INJECTION, EMULSION INTRAVENOUS at 09:01

## 2024-04-15 RX ADMIN — LIDOCAINE HYDROCHLORIDE 50 MG: 20 INJECTION, SOLUTION EPIDURAL; INFILTRATION; INTRACAUDAL; PERINEURAL at 08:50

## 2024-04-15 RX ADMIN — PROPOFOL 30 MG: 10 INJECTION, EMULSION INTRAVENOUS at 09:07

## 2024-04-15 RX ADMIN — PROPOFOL 30 MG: 10 INJECTION, EMULSION INTRAVENOUS at 08:55

## 2024-04-15 RX ADMIN — PROPOFOL 30 MG: 10 INJECTION, EMULSION INTRAVENOUS at 08:51

## 2024-04-15 RX ADMIN — PROPOFOL 30 MG: 10 INJECTION, EMULSION INTRAVENOUS at 08:52

## 2024-04-15 RX ADMIN — PROPOFOL 30 MG: 10 INJECTION, EMULSION INTRAVENOUS at 09:06

## 2024-04-15 RX ADMIN — PROPOFOL 30 MG: 10 INJECTION, EMULSION INTRAVENOUS at 09:09

## 2024-04-15 RX ADMIN — PROPOFOL 30 MG: 10 INJECTION, EMULSION INTRAVENOUS at 09:02

## 2024-04-15 RX ADMIN — SODIUM CHLORIDE: 9 INJECTION, SOLUTION INTRAVENOUS at 08:11

## 2024-04-15 RX ADMIN — PROPOFOL 30 MG: 10 INJECTION, EMULSION INTRAVENOUS at 09:13

## 2024-04-15 NOTE — INTERVAL H&P NOTE
Pre-Endoscopy H&P Update  Chief complaint/HPI/ROS:  The indication for the procedure, the patient's history and the patient's current medications are reviewed prior to the procedure and that data is reported on the H&P to which this document is attached.  Any significant complaints with regard to organ systems will be noted, and if not mentioned then a review of systems is not contributory.  Past Medical History:   Diagnosis Date    Bronchitis       Past Surgical History:   Procedure Laterality Date    BUNIONECTOMY Bilateral     x2 both feet    ORTHOPEDIC SURGERY      right hand ligament repair     Social   Social History     Tobacco Use    Smoking status: Former     Current packs/day: 0.25     Types: Cigarettes    Smokeless tobacco: Never   Substance Use Topics    Alcohol use: Yes     Alcohol/week: 0.8 standard drinks of alcohol      Family History   Problem Relation Age of Onset    Elevated Lipids Mother     Hypertension Father     Cancer Father         pancreatic s/p whipple      Allergies   Allergen Reactions    Metronidazole Hives    Shellfish Allergy Hives    Iodine Nausea And Vomiting      None       PHYSICAL EXAM:  The patient is examined immediately prior to the procedure.  There were no vitals filed for this visit.  Gen: Appears comfortable, no distress.  Pulm: comfortable respirations with no abnormal audible breath sounds  HEART: well perfused, no abnormal audible heart sounds  GI: abdomen flat.    PLAN:  Informed consent discussion held, patient afforded an opportunity to ask questions and all questions answered.  After being advised of the risks, benefits, and alternatives, the patient requested that we proceed and indicated so on a written consent form.      Will proceed with procedure as planned.  Farnaz Stephenson Jr, MD

## 2024-04-15 NOTE — ANESTHESIA PRE PROCEDURE
Applicable): No results found for: \"COVID19\"        Anesthesia Evaluation     Anesthesia Plan        Aaron Gandara MD   4/15/2024

## 2024-04-15 NOTE — H&P
63 y.o. female presents for diagnostic colonoscopy for rectal bleeding.  Additional H&P data will be attached on the day of procedure.    Farnaz Stephenson Jr, MD

## 2024-04-15 NOTE — ANESTHESIA PRE PROCEDURE
COVID-19 Screening (If Applicable): No results found for: \"COVID19\"        Anesthesia Evaluation  Patient summary reviewed and Nursing notes reviewed   no history of anesthetic complications:   Airway: Mallampati: III  TM distance: >3 FB   Neck ROM: full  Mouth opening: > = 3 FB   Dental: normal exam   (+) caps      Pulmonary:Negative Pulmonary ROS and normal exam  breath sounds clear to auscultation  (+)     sleep apnea:                                  Cardiovascular:Negative CV ROS  Exercise tolerance: good (>4 METS)          Rhythm: regular  Rate: normal                    Neuro/Psych:   Negative Neuro/Psych ROS              GI/Hepatic/Renal: Neg GI/Hepatic/Renal ROS  (+) morbid obesity          Endo/Other: Negative Endo/Other ROS                    Abdominal: normal exam            Vascular: negative vascular ROS.         Other Findings:       Anesthesia Plan      MAC     ASA 2       Induction: intravenous.    MIPS: Postoperative opioids intended and Prophylactic antiemetics administered.  Anesthetic plan and risks discussed with patient.    Use of blood products discussed with patient whom consented to blood products.    Plan discussed with CRNA and surgical team.                Aaron Gandara MD   4/15/2024

## 2024-04-15 NOTE — ANESTHESIA POSTPROCEDURE EVALUATION
Department of Anesthesiology  Postprocedure Note    Patient: Alejandra Baeza  MRN: [id number]  YOB: 1960  Date of evaluation: 4/15/2024    Procedure Summary       Date: 04/15/24 Room / Location: [facility] ENDO 03 / [facility] ENDOSCOPY    Anesthesia Start: 0846 Anesthesia Stop: 0921    Procedure: COLONOSCOPY DIAGNOSTIC (Lower GI Region) Diagnosis:       Screen for colon cancer      (Screen for colon cancer [Z12.11])    Surgeons: Farnaz Stephenson MD Responsible Provider: Aaron Gandara MD    Anesthesia Type: MAC ASA Status: 2            Anesthesia Type: No value filed.    Juarez Phase I: Juarez Score: 10    Juarez Phase II: Juarez Score: 10    Anesthesia Post Evaluation    Patient location during evaluation: PACU  Patient participation: complete - patient participated  Level of consciousness: responsive to verbal stimuli and sleepy but conscious  Pain score: 2  Airway patency: patent  Cardiovascular status: blood pressure returned to baseline  Respiratory status: acceptable  Hydration status: stable  Comments: +Post-Anesthesia Evaluation and Assessment    Patient: Alejandra Baeza MRN: [id number]  SSN: [id number]   YOB: 1960  Age: 63 y.o.  Sex: female          Cardiovascular Function/Vital Signs    BP (!) 155/78   Pulse 56   Temp 98 °F (36.7 °C) (Temporal)   Resp 15   Ht 1.676 m (5' 6\")   Wt 112.3 kg (247 lb 9.6 oz)   SpO2 97%   BMI 39.96 kg/m²     Patient is status post Procedure(s):  COLONOSCOPY DIAGNOSTIC.    Nausea/Vomiting: Controlled.    Postoperative hydration reviewed and adequate.    Pain:      Managed.    Neurological Status:       At baseline.    Mental Status and Level of Consciousness: Arousable.    Pulmonary Status:       Adequate oxygenation and airway patent.    Complications related to anesthesia: None    Post-anesthesia assessment completed. No concerns.    I have evaluated the patient and the patient is stable and ready to be discharged from PACU .    Signed

## 2024-04-15 NOTE — PROGRESS NOTES

## 2024-04-15 NOTE — DISCHARGE INSTRUCTIONS
[facility]  [facility]  MARY Porter Jr, MD  [phone]      April 15, 2024    Alejandra Baeza  YOB: 1960    COLONOSCOPY DISCHARGE INSTRUCTIONS    If there is redness at IV site you should apply warm compress to area.  If redness or soreness persist contact Dr. Porter's office or your primary care doctor.    There may be a slight amount of blood passed from the rectum.  Gaseous discomfort may develop, but walking, belching will help relieve this.  You may not operate a vehicle for 12 hours  You may not operate machinery or dangerous appliances for rest of today  You may not drink alcoholic beverages for 12 hours  Avoid making any critical decisions for 24 hours    DIET:  You may resume your normal diet, but some patients find that heavy or large meals may lead to indigestion or vomiting.  I suggest a light meal as first food intake.    MEDICATIONS:  The use of some over-the-counter pain medication may lead to bleeding after colon biopsies or polyp removal.  Tylenol (also called acetaminophen) is safe to take even if you have had colonoscopy with polyp removal.  Based on the procedure you had today you may not safely take aspirin or aspirin-like products for the next seven (7) days.  Remember that Tylenol (also called acetaminophen) is safe to take after colonoscopy even if you have had biopsies or polyps removed.    ACTIVITY:  You may resume your normal household activities, but it is recommended that you spend the remainder of the day resting -  avoid any strenuous activity.    CALL DR. PORTER'S OFFICE IF:  Increasing pain, nausea, vomiting  Abdominal distension (swelling)  Significant new or increased bleeding (oral or rectal)  Fever/Chills  Chest pain/shortness of breath                       Additional instructions:   Impression:  Ascending colon polyps x 2  Rectum colon polyps x 3  Sigmoid

## 2024-04-15 NOTE — OP NOTE
[facility]  [facility]  MARY Stephenson Jr, MD  [phone]      April 15, 2024    Colonoscopy Procedure Note  Alejandra Baeza  :  1960  [place] Medical Record Number: [id number]    Indications:   Personal history of colon polyps  PCP:  Nicolas Richter MD  Anesthesia/Sedation: See Anesthesia Record  Endoscopist:  Dr. MARY Stephenson Jr  Complications:  None  Estimated Blood Loss:  None    Surgical assistant: Circulator: Joana Posada RN; Ronda Deleon RN     Implants none unless otherwise specified.     Permit:  The indications, risks, benefits and alternatives were reviewed with the patient or their decision maker who was provided an opportunity to ask questions and all questions were answered.  The specific risks of colonoscopy with conscious sedation were reviewed, including but not limited to anesthetic complication, bleeding, adverse drug reaction, missed lesion, infection, IV site reactions, and intestinal perforation which would lead to the need for surgical repair.  Alternatives to colonoscopy including radiographic imaging, observation without testing, or laboratory testing were reviewed including the limitations of those alternatives.  After considering the options and having all their questions answered, the patient or their decision maker provided both verbal and written consent to proceed.        Procedure in Detail:  After obtaining informed consent, positioning of the patient in the left lateral decubitus position, and conduction of a pre-procedure pause or \"time out\" the endoscope was introduced into the anus and advanced to the cecum, which was identified by the ileocecal valve and appendiceal orifice.  The quality of the colonic preparation was good.  A careful inspection was made as the colonoscope was withdrawn, findings and interventions are described below.    Findings:   There was

## (undated) DEVICE — ELECTRODE PT RET AD L9FT HI MOIST COND ADH HYDRGEL CORDED

## (undated) DEVICE — SUPPLEMENT DIGESTIVE H2O SOL GI-EASE

## (undated) DEVICE — SINGLE-USE POLYPECTOMY SNARE: Brand: CAPTIVATOR

## (undated) DEVICE — CONTAINER SPEC 20ML NEUT BUFF FRMLN PREFIL STATLAB

## (undated) DEVICE — Device: Brand: SINGLE USE INJECTOR NM600/610

## (undated) DEVICE — TRAP SURG QUAD PARABOLA SLOT DSGN SFTY SCRN TRAPEASE

## (undated) DEVICE — TUBING IRRIG COMPATIBLE W ERBE MEDIVATOR PMP HYDR